# Patient Record
Sex: MALE | Race: WHITE | NOT HISPANIC OR LATINO | Employment: FULL TIME | URBAN - METROPOLITAN AREA
[De-identification: names, ages, dates, MRNs, and addresses within clinical notes are randomized per-mention and may not be internally consistent; named-entity substitution may affect disease eponyms.]

---

## 2017-01-12 ENCOUNTER — OFFICE VISIT (OUTPATIENT)
Dept: FAMILY MEDICINE CLINIC | Facility: CLINIC | Age: 57
End: 2017-01-12

## 2017-01-12 VITALS
SYSTOLIC BLOOD PRESSURE: 124 MMHG | HEART RATE: 80 BPM | BODY MASS INDEX: 35.55 KG/M2 | DIASTOLIC BLOOD PRESSURE: 82 MMHG | WEIGHT: 240 LBS | HEIGHT: 69 IN | RESPIRATION RATE: 18 BRPM | OXYGEN SATURATION: 97 %

## 2017-01-12 DIAGNOSIS — Z00.00 ANNUAL PHYSICAL EXAM: ICD-10-CM

## 2017-01-12 DIAGNOSIS — E11.9 TYPE 2 DIABETES MELLITUS WITHOUT COMPLICATION, WITHOUT LONG-TERM CURRENT USE OF INSULIN (HCC): ICD-10-CM

## 2017-01-12 DIAGNOSIS — K63.5 COLON POLYPS: ICD-10-CM

## 2017-01-12 DIAGNOSIS — N40.0 PROSTATISM: ICD-10-CM

## 2017-01-12 DIAGNOSIS — K58.0 IRRITABLE BOWEL SYNDROME WITH DIARRHEA: Primary | ICD-10-CM

## 2017-01-12 DIAGNOSIS — R73.9 HYPERGLYCEMIA: ICD-10-CM

## 2017-01-12 PROBLEM — N52.9 IMPOTENCE OF ORGANIC ORIGIN: Status: ACTIVE | Noted: 2017-01-12

## 2017-01-12 PROBLEM — G47.00 INSOMNIA: Status: ACTIVE | Noted: 2017-01-12

## 2017-01-12 PROBLEM — E55.9 VITAMIN D DEFICIENCY: Status: ACTIVE | Noted: 2017-01-12

## 2017-01-12 PROBLEM — G47.33 OBSTRUCTIVE SLEEP APNEA SYNDROME: Status: ACTIVE | Noted: 2017-01-12

## 2017-01-12 PROCEDURE — 99214 OFFICE O/P EST MOD 30 MIN: CPT | Performed by: FAMILY MEDICINE

## 2017-01-12 NOTE — PROGRESS NOTES
"Subjective   Ki Draper is a 56 y.o. male.     History of Present Illness Has \"gas problems\" under L breast. Also in the abdomen in the groin area with his BM- like a gurgle. Belching. BM's are runny to normal. Has hx colon polyps from scope 7 yr ago.  Does use his CPAP machine.  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  No exercise.    The following portions of the patient's history were reviewed and updated as appropriate: allergies, current medications, past social history and problem list.    Review of Systems   Constitutional: Negative for activity change, appetite change and unexpected weight change.   HENT: Negative for nosebleeds and trouble swallowing.    Eyes: Negative for pain and visual disturbance.   Respiratory: Negative for chest tightness, shortness of breath and wheezing.    Cardiovascular: Negative for chest pain and palpitations.   Gastrointestinal: Positive for abdominal distention, abdominal pain and diarrhea. Negative for blood in stool.        Flatus and eructation   Endocrine: Negative.    Genitourinary: Negative for difficulty urinating and hematuria.   Musculoskeletal: Negative for joint swelling.   Skin: Negative for color change and rash.   Allergic/Immunologic: Negative.    Neurological: Negative for syncope and speech difficulty.   Hematological: Negative for adenopathy.   Psychiatric/Behavioral: Negative for agitation and confusion.   All other systems reviewed and are negative.      Objective   Visit Vitals   • /82   • Pulse 80   • Resp 18   • Ht 69\" (175.3 cm)   • Wt 240 lb (109 kg)   • SpO2 97%   • BMI 35.44 kg/m2     Physical Exam   Constitutional: He is oriented to person, place, and time. Vital signs are normal. He appears well-developed and well-nourished. No distress.   HENT:   Head: Normocephalic.   Neck: Carotid bruit is not present. No thyromegaly present.   Cardiovascular: Normal rate, regular rhythm and normal heart " sounds.    Pulmonary/Chest: Effort normal and breath sounds normal.    Ki had a diabetic foot exam performed today.   During the foot exam he had a monofilament test performed.    Neurological Sensory Findings -  Unaltered sharp/dull right ankle/foot discrimination and unaltered sharp/dull left ankle/foot discrimination.    Vascular Status -  His exam exhibits right foot vasculature abnormal. His exam exhibits left foot vasculature abnormal.  Neurological: He is alert and oriented to person, place, and time. Gait normal.   Psychiatric: He has a normal mood and affect. His behavior is normal. Judgment and thought content normal.   Vitals reviewed.      Assessment/Plan   Problem List Items Addressed This Visit        Digestive    Irritable bowel syndrome with diarrhea - Primary       Endocrine    Diabetes mellitus      Other Visit Diagnoses     Colon polyps        Relevant Orders    Ambulatory Referral For Screening Colonoscopy    Prostatism        Relevant Orders    Hemoglobin A1c    PSA    Hyperglycemia        Relevant Orders    Microalbumin / Creatinine Urine Ratio    Annual physical exam        Relevant Orders    CBC & Differential    Comprehensive Metabolic Panel    Lipid Panel With / Chol / HDL Ratio    TSH    Vitamin D 25 Hydroxy           Stop cruciferous veges.  Needs an appt every 6 months for diabetes and   A1C. Pt is aware.  Start exercise program.

## 2017-01-12 NOTE — MR AVS SNAPSHOT
Ki Draper   1/12/2017 10:45 AM   Office Visit    Dept Phone:  [phone]   Encounter #:  [id number]    Provider:  Alina Barrios MD   Department:  [facility]                Your Full Care Plan              Your Updated Medication List          This list is accurate as of: 1/12/17 11:36 AM.  Always use your most recent med list.                * GLIPIZIDE XL 10 MG 24 hr tablet   Generic drug:  glipiZIDE       * GLIPIZIDE XL 10 MG 24 hr tablet   Generic drug:  glipiZIDE   TAKE ONE TABLET BY MOUTH DAILY AS DIRECTED       losartan 25 MG tablet   Commonly known as:  COZAAR   TAKE ONE-HALF TABLET BY MOUTH DAILY       metFORMIN 1000 MG tablet   Commonly known as:  GLUCOPHAGE   TAKE TWO TABLETS BY MOUTH DAILY       tadalafil 5 MG tablet   Commonly known as:  CIALIS   Take 1 tablet by mouth daily as needed for erectile dysfunction.       tamsulosin 0.4 MG capsule 24 hr capsule   Commonly known as:  FLOMAX   TAKE ONE CAPSULE BY MOUTH ONCE NIGHTLY       zolpidem 10 MG tablet   Commonly known as:  AMBIEN   TAKE ONE TABLET BY MOUTH EVERY NIGHT AT BEDTIME       * Notice:  This list has 2 medication(s) that are the same as other medications prescribed for you. Read the directions carefully, and ask your doctor or other care provider to review them with you.            We Performed the Following     Ambulatory Referral For Screening Colonoscopy     CBC & Differential     Comprehensive Metabolic Panel     Hemoglobin A1c     Lipid Panel With / Chol / HDL Ratio     Microalbumin / Creatinine Urine Ratio     PSA     TSH     Vitamin D 25 Hydroxy       You Were Diagnosed With        Codes Comments    Colon polyps    -  Primary ICD-10-CM: K63.5  ICD-9-CM: 211.3     Prostatism     ICD-10-CM: N40.0  ICD-9-CM: 600.90     Hyperglycemia     ICD-10-CM: R73.9  ICD-9-CM: 790.29     Annual physical exam     ICD-10-CM: Z00.00  ICD-9-CM: V70.0       Instructions     None    "Patient Instructions History      Upcoming Appointments     Visit Type Date Time Department    OFFICE VISIT 2017 10:45 AM [facility]      Kato Signup     [facility] Kato allows you to send messages to your doctor, view your test results, renew your prescriptions, schedule appointments, and more. To sign up, go to [employer] and click on the Sign Up Now link in the New User? box. Enter your Kato Activation Code exactly as it appears below along with the last four digits of your Social Security Number and your Date of Birth () to complete the sign-up process. If you do not sign up before the expiration date, you must request a new code.    Kato Activation Code: [email or web address]  Expires: 2017 11:36 AM    If you have questions, you can email [email or web address] or call [phone] to talk to our Kato staff. Remember, Kato is NOT to be used for urgent needs. For medical emergencies, dial 911.               Other Info from Your Visit           Allergies     Other        Reason for Visit     Heartburn     Abdominal Pain after a bowel movement    Shortness of Breath           Vital Signs     Blood Pressure Pulse Respirations Height Weight Oxygen Saturation    124/82 80 18 69\" (175.3 cm) 240 lb (109 kg) 97%    Body Mass Index                   35.44 kg/m2           Problems and Diagnoses Noted     Diabetes    Failure of erection    Difficulty falling or staying asleep    Sleep apnea    Vitamin D deficiency    Colon polyp    -  Primary    Enlarged prostate        Hyperglycemia        Annual physical exam            "

## 2017-01-13 LAB
25(OH)D3+25(OH)D2 SERPL-MCNC: 25 NG/ML (ref 30–100)
ALBUMIN SERPL-MCNC: 4.7 G/DL (ref 3.5–5.2)
ALBUMIN/CREAT UR: <4.6 MG/G CREAT (ref 0–30)
ALBUMIN/GLOB SERPL: 1.8 G/DL
ALP SERPL-CCNC: 86 U/L (ref 39–117)
ALT SERPL-CCNC: 18 U/L (ref 1–41)
AST SERPL-CCNC: 13 U/L (ref 1–40)
BASOPHILS # BLD AUTO: 0.06 10*3/MM3 (ref 0–0.2)
BASOPHILS NFR BLD AUTO: 0.8 % (ref 0–1.5)
BILIRUB SERPL-MCNC: 0.9 MG/DL (ref 0.1–1.2)
BUN SERPL-MCNC: 12 MG/DL (ref 6–20)
BUN/CREAT SERPL: 11.2 (ref 7–25)
CALCIUM SERPL-MCNC: 9.7 MG/DL (ref 8.6–10.5)
CHLORIDE SERPL-SCNC: 98 MMOL/L (ref 98–107)
CHOLEST SERPL-MCNC: 203 MG/DL (ref 0–200)
CHOLEST/HDLC SERPL: 5.97 {RATIO}
CO2 SERPL-SCNC: 25.5 MMOL/L (ref 22–29)
CREAT SERPL-MCNC: 1.07 MG/DL (ref 0.76–1.27)
CREAT UR-MCNC: 65.3 MG/DL
EOSINOPHIL # BLD AUTO: 0.19 10*3/MM3 (ref 0–0.7)
EOSINOPHIL NFR BLD AUTO: 2.5 % (ref 0.3–6.2)
ERYTHROCYTE [DISTWIDTH] IN BLOOD BY AUTOMATED COUNT: 13.3 % (ref 11.5–14.5)
GLOBULIN SER CALC-MCNC: 2.6 GM/DL
GLUCOSE SERPL-MCNC: 179 MG/DL (ref 65–99)
HBA1C MFR BLD: 8 % (ref 4.8–5.6)
HCT VFR BLD AUTO: 47.4 % (ref 40.4–52.2)
HDLC SERPL-MCNC: 34 MG/DL (ref 40–60)
HGB BLD-MCNC: 15.7 G/DL (ref 13.7–17.6)
IMM GRANULOCYTES # BLD: 0.03 10*3/MM3 (ref 0–0.03)
IMM GRANULOCYTES NFR BLD: 0.4 % (ref 0–0.5)
LDLC SERPL CALC-MCNC: 101 MG/DL (ref 0–100)
LYMPHOCYTES # BLD AUTO: 1.87 10*3/MM3 (ref 0.9–4.8)
LYMPHOCYTES NFR BLD AUTO: 24.6 % (ref 19.6–45.3)
MCH RBC QN AUTO: 30.7 PG (ref 27–32.7)
MCHC RBC AUTO-ENTMCNC: 33.1 G/DL (ref 32.6–36.4)
MCV RBC AUTO: 92.6 FL (ref 79.8–96.2)
MICROALBUMIN UR-MCNC: <3 UG/ML
MONOCYTES # BLD AUTO: 0.63 10*3/MM3 (ref 0.2–1.2)
MONOCYTES NFR BLD AUTO: 8.3 % (ref 5–12)
NEUTROPHILS # BLD AUTO: 4.81 10*3/MM3 (ref 1.9–8.1)
NEUTROPHILS NFR BLD AUTO: 63.4 % (ref 42.7–76)
PLATELET # BLD AUTO: 305 10*3/MM3 (ref 140–500)
POTASSIUM SERPL-SCNC: 4.7 MMOL/L (ref 3.5–5.2)
PROT SERPL-MCNC: 7.3 G/DL (ref 6–8.5)
PSA SERPL-MCNC: 1.73 NG/ML (ref 0–4)
RBC # BLD AUTO: 5.12 10*6/MM3 (ref 4.6–6)
SODIUM SERPL-SCNC: 140 MMOL/L (ref 136–145)
TRIGL SERPL-MCNC: 342 MG/DL (ref 0–150)
TSH SERPL DL<=0.005 MIU/L-ACNC: 1.16 MIU/ML (ref 0.27–4.2)
VLDLC SERPL CALC-MCNC: 68.4 MG/DL (ref 5–40)
WBC # BLD AUTO: 7.59 10*3/MM3 (ref 4.5–10.7)

## 2017-01-24 DIAGNOSIS — E78.1 HYPERTRIGLYCERIDEMIA: Primary | ICD-10-CM

## 2017-01-24 RX ORDER — ICOSAPENT ETHYL 1000 MG/1
2 CAPSULE ORAL 2 TIMES DAILY WITH MEALS
Qty: 120 CAPSULE | Refills: 0 | COMMUNITY
Start: 2017-01-24 | End: 2018-03-08

## 2017-03-01 ENCOUNTER — LAB (OUTPATIENT)
Dept: FAMILY MEDICINE CLINIC | Facility: CLINIC | Age: 57
End: 2017-03-01

## 2017-03-01 DIAGNOSIS — E78.49 OTHER HYPERLIPIDEMIA: Primary | ICD-10-CM

## 2017-03-01 DIAGNOSIS — Z79.899 HIGH RISK MEDICATION USE: ICD-10-CM

## 2017-03-01 LAB
ALBUMIN SERPL-MCNC: 4.3 G/DL (ref 3.5–5.2)
ALBUMIN/GLOB SERPL: 1.8 G/DL
ALP SERPL-CCNC: 73 U/L (ref 39–117)
ALT SERPL-CCNC: 16 U/L (ref 1–41)
AST SERPL-CCNC: 10 U/L (ref 1–40)
BILIRUB SERPL-MCNC: 1.3 MG/DL (ref 0.1–1.2)
BUN SERPL-MCNC: 8 MG/DL (ref 6–20)
BUN/CREAT SERPL: 7.9 (ref 7–25)
CALCIUM SERPL-MCNC: 9.7 MG/DL (ref 8.6–10.5)
CHLORIDE SERPL-SCNC: 99 MMOL/L (ref 98–107)
CHOLEST SERPL-MCNC: 210 MG/DL (ref 0–200)
CO2 SERPL-SCNC: 27.8 MMOL/L (ref 22–29)
CREAT SERPL-MCNC: 1.01 MG/DL (ref 0.76–1.27)
GLOBULIN SER CALC-MCNC: 2.4 GM/DL
GLUCOSE SERPL-MCNC: 218 MG/DL (ref 65–99)
HDLC SERPL-MCNC: 35 MG/DL (ref 40–60)
LDLC SERPL CALC-MCNC: 120 MG/DL (ref 0–100)
LDLC/HDLC SERPL: 3.42 {RATIO}
POTASSIUM SERPL-SCNC: 4.8 MMOL/L (ref 3.5–5.2)
PROT SERPL-MCNC: 6.7 G/DL (ref 6–8.5)
SODIUM SERPL-SCNC: 140 MMOL/L (ref 136–145)
TRIGL SERPL-MCNC: 276 MG/DL (ref 0–150)
VLDLC SERPL CALC-MCNC: 55.2 MG/DL (ref 5–40)

## 2017-03-07 RX ORDER — LOSARTAN POTASSIUM 25 MG/1
TABLET ORAL
Qty: 45 TABLET | Refills: 3 | Status: SHIPPED | OUTPATIENT
Start: 2017-03-07 | End: 2018-03-08 | Stop reason: SDUPTHER

## 2017-03-13 ENCOUNTER — TELEPHONE (OUTPATIENT)
Dept: FAMILY MEDICINE CLINIC | Facility: CLINIC | Age: 57
End: 2017-03-13

## 2017-03-13 NOTE — TELEPHONE ENCOUNTER
----- Message from Alina Barrios MD sent at 3/2/2017  9:24 AM EST -----  Appt to discuss sugar average of 183 and cholesterol numbers, which are worse on the repeat test. Let me know if he doesnot make this appt.

## 2017-03-15 DIAGNOSIS — E11.65 TYPE 2 DIABETES MELLITUS WITH HYPERGLYCEMIA, WITHOUT LONG-TERM CURRENT USE OF INSULIN (HCC): Primary | ICD-10-CM

## 2017-03-16 DIAGNOSIS — E78.5 HYPERLIPIDEMIA, UNSPECIFIED HYPERLIPIDEMIA TYPE: Primary | ICD-10-CM

## 2017-03-16 RX ORDER — ATORVASTATIN CALCIUM 10 MG/1
10 TABLET, FILM COATED ORAL DAILY
Qty: 30 TABLET | Refills: 1 | Status: SHIPPED | OUTPATIENT
Start: 2017-03-16 | End: 2018-03-08

## 2017-03-28 ENCOUNTER — OFFICE VISIT (OUTPATIENT)
Dept: GASTROENTEROLOGY | Facility: CLINIC | Age: 57
End: 2017-03-28

## 2017-03-28 VITALS
HEIGHT: 69 IN | BODY MASS INDEX: 35.4 KG/M2 | DIASTOLIC BLOOD PRESSURE: 74 MMHG | SYSTOLIC BLOOD PRESSURE: 122 MMHG | WEIGHT: 239 LBS

## 2017-03-28 DIAGNOSIS — K58.0 IRRITABLE BOWEL SYNDROME WITH DIARRHEA: ICD-10-CM

## 2017-03-28 DIAGNOSIS — R10.30 LOWER ABDOMINAL PAIN: Primary | ICD-10-CM

## 2017-03-28 PROCEDURE — 99203 OFFICE O/P NEW LOW 30 MIN: CPT | Performed by: INTERNAL MEDICINE

## 2017-03-28 RX ORDER — LANSOPRAZOLE 15 MG/1
15 CAPSULE, DELAYED RELEASE ORAL DAILY
COMMUNITY
End: 2019-05-24

## 2017-03-28 RX ORDER — CHLORAL HYDRATE 500 MG
CAPSULE ORAL
COMMUNITY

## 2017-03-28 RX ORDER — FAMOTIDINE 10 MG
10 TABLET ORAL 2 TIMES DAILY
COMMUNITY
End: 2019-08-26

## 2017-03-28 NOTE — PROGRESS NOTES
"Chief Complaint   Patient presents with   • Abdominal Pain        Ki Draper is a  56 y.o. male here for an initial visit for abdominal pain, IBS    HPI this 56-year-old patient of Dr. Alina Barrios presents with a 6 week history of \"tightness and trapped gas\" affecting the left upper quadrant underneath the left breast.  There was also some associated \"gurgling and bloating gas\" it caused him to be short of breath.  He treated this with Gas-X with not complete resolution.  The pain that was associated with this has resolved at this time and he does admit to use of Pepcid at bedtime and Prevacid over-the-counter in the morning.  He also had been taking probiotics.  The gurgling seem to either be followed by belching or passing gas from below.  His bowel pattern usually ranges from 2-3 stools a day to every second day and are formed in nature however since he started Vascepa he has noted increasing bowel frequency to 2 stools a day usually loose in consistency.  He has some left lower quadrant discomfort of a pinching nature that seems to be associated before defecation and is alleviated by bowel movements.  He has lost approximately 24 pounds over the past 8 months by choice.  He admits to adjusting his diet with more vegetable intake and that may be a contributing factor to his gas production.  He denies any melena or bright red blood per rectum.  He had undergone both upper and lower endoscopic evaluations back in January 2010 : upper endoscopy revealed some bile reflux and mild esophagitis, colonoscopy showed 2 5-7 mm hyperplastic polyps in the sigmoid colon and serial biopsies obtained at that time were all normal.  We discussed options in terms of evaluation to include CT scan of the abdomen and pelvis versus endoscopy versus observation.  He prefers to observe for the next 2 weeks and will call with a progress report.    Past Medical History:   Diagnosis Date   • Diabetes mellitus    • Hyperlipidemia  "       Current Outpatient Prescriptions   Medication Sig Dispense Refill   • Cyanocobalamin (VITAMIN B 12 PO) Take  by mouth.     • famotidine (PEPCID) 10 MG tablet Take 10 mg by mouth 2 (Two) Times a Day.     • GLIPIZIDE XL 10 MG 24 hr tablet TAKE ONE TABLET BY MOUTH DAILY AS DIRECTED 90 tablet 0   • icosapent ethyl (VASCEPA) 1 G capsule capsule Take 2 g by mouth 2 (Two) Times a Day With Meals. 120 capsule 0   • lansoprazole (PREVACID) 15 MG capsule Take 15 mg by mouth Daily.     • losartan (COZAAR) 25 MG tablet TAKE ONE-HALF TABLET BY MOUTH DAILY 45 tablet 3   • metFORMIN (GLUCOPHAGE) 1000 MG tablet TAKE TWO TABLETS BY MOUTH DAILY 180 tablet 3   • Multiple Vitamin (MULTI VITAMIN PO) Take  by mouth.     • Omega-3 Fatty Acids (FISH OIL) 1000 MG capsule capsule Take  by mouth Daily With Breakfast.     • tadalafil (CIALIS) 5 MG tablet Take 1 tablet by mouth daily as needed for erectile dysfunction. 30 tablet 3   • tamsulosin (FLOMAX) 0.4 MG capsule 24 hr capsule TAKE ONE CAPSULE BY MOUTH ONCE NIGHTLY 90 capsule 0   • zolpidem (AMBIEN) 10 MG tablet TAKE ONE TABLET BY MOUTH EVERY NIGHT AT BEDTIME 30 tablet 1   • atorvastatin (LIPITOR) 10 MG tablet Take 1 tablet by mouth Daily. 30 tablet 1     No current facility-administered medications for this visit.        PRN Meds:.    Allergies   Allergen Reactions   • Other        Social History     Social History   • Marital status:      Spouse name: N/A   • Number of children: N/A   • Years of education: N/A     Occupational History   • Not on file.     Social History Main Topics   • Smoking status: Never Smoker   • Smokeless tobacco: Never Used   • Alcohol use Yes   • Drug use: No   • Sexual activity: Not on file     Other Topics Concern   • Not on file     Social History Narrative   • No narrative on file       History reviewed. No pertinent family history.    Review of Systems   Constitutional: Negative for activity change, appetite change, fatigue and unexpected weight  change.   HENT: Negative for congestion, facial swelling, sore throat, trouble swallowing and voice change.    Eyes: Negative for photophobia and visual disturbance.   Respiratory: Negative for cough and choking.    Cardiovascular: Negative for chest pain.   Gastrointestinal: Positive for abdominal pain and diarrhea. Negative for abdominal distention, anal bleeding, blood in stool, constipation, nausea, rectal pain and vomiting.        Excess gas   Endocrine: Negative for polyphagia.   Musculoskeletal: Negative for arthralgias, gait problem and joint swelling.   Skin: Negative for color change, pallor and rash.   Allergic/Immunologic: Negative for food allergies.   Neurological: Negative for speech difficulty and headaches.   Hematological: Does not bruise/bleed easily.   Psychiatric/Behavioral: Negative for agitation, confusion and sleep disturbance.       Vitals:    03/28/17 0800   BP: 122/74       Physical Exam   Constitutional: He is oriented to person, place, and time. He appears well-developed and well-nourished. No distress.   HENT:   Head: Normocephalic.   Mouth/Throat: Oropharynx is clear and moist. No oropharyngeal exudate.   Eyes: Conjunctivae and EOM are normal. No scleral icterus.   Neck: Normal range of motion. No thyromegaly present.   Cardiovascular: Normal rate and regular rhythm.    No murmur heard.  Pulmonary/Chest: Breath sounds normal. No respiratory distress. He has no wheezes. He has no rales.   Abdominal: Soft. Bowel sounds are normal. He exhibits no distension and no mass. There is no hepatosplenomegaly. There is tenderness.   Right upper quadrant, epigastric, left upper quadrant tenderness   Musculoskeletal: Normal range of motion. He exhibits no edema or tenderness.   Lymphadenopathy:     He has no cervical adenopathy.   Neurological: He is alert and oriented to person, place, and time.   Skin: Skin is warm and dry. No rash noted. He is not diaphoretic. No erythema.   Psychiatric: He has a  normal mood and affect. His behavior is normal.   Vitals reviewed.      ASSESSMENT   #1 abdominal distention/pain: May be associated with increase in gas production, cannot rule out the possibility of gastric or colonic inflammatory changes.  Improvement with the PPI suggest acid related process.  #2 change in bowel pattern: May be medication influence, may also be associated with dietary changes  #3 IBS  #4 history of hyperplastic polyps      PLAN  Patient to observe condition with continuing current medication regimen and will call with a progress report in 2 weeks.  If symptoms persist will offer CT of the abdomen and pelvis  If scan is negative or inconclusive will consider endoscopic evaluation.  Would warrant follow-up colonoscopy in 2020 for routine health maintenance      ICD-10-CM ICD-9-CM   1. Lower abdominal pain R10.30 789.09   2. Irritable bowel syndrome with diarrhea K58.0 564.1

## 2017-03-29 RX ORDER — GLIPIZIDE 10 MG/1
TABLET, FILM COATED, EXTENDED RELEASE ORAL
Qty: 90 TABLET | Refills: 4 | Status: SHIPPED | OUTPATIENT
Start: 2017-03-29 | End: 2018-03-08 | Stop reason: ALTCHOICE

## 2017-04-06 RX ORDER — ZOLPIDEM TARTRATE 10 MG/1
TABLET ORAL
Qty: 30 TABLET | Refills: 1 | Status: SHIPPED | OUTPATIENT
Start: 2017-04-06 | End: 2017-07-01 | Stop reason: SDUPTHER

## 2017-05-11 DIAGNOSIS — E16.2 HYPOGLYCEMIA: Primary | ICD-10-CM

## 2017-05-11 DIAGNOSIS — E78.49 OTHER HYPERLIPIDEMIA: ICD-10-CM

## 2017-05-11 LAB — HBA1C MFR BLD: 7.7 % (ref 4.8–5.6)

## 2017-07-03 RX ORDER — ZOLPIDEM TARTRATE 10 MG/1
TABLET ORAL
Qty: 30 TABLET | Refills: 1 | Status: SHIPPED | OUTPATIENT
Start: 2017-07-03 | End: 2018-03-08 | Stop reason: SDUPTHER

## 2017-08-08 ENCOUNTER — TELEPHONE (OUTPATIENT)
Dept: FAMILY MEDICINE CLINIC | Facility: CLINIC | Age: 57
End: 2017-08-08

## 2017-08-08 NOTE — TELEPHONE ENCOUNTER
He needs to ask his pharmacist if they will take a coupon from [facility] as that is the cheapest. He can print that off from his computer.  He can have sildenafil 25 mg, 2 or 3 prn once a day.  #10 rf5

## 2017-08-10 RX ORDER — SILDENAFIL 25 MG/1
TABLET, FILM COATED ORAL
Qty: 10 TABLET | Refills: 5 | Status: SHIPPED | OUTPATIENT
Start: 2017-08-10 | End: 2018-03-08

## 2017-08-14 RX ORDER — SILDENAFIL CITRATE 20 MG/1
TABLET ORAL
Qty: 10 TABLET | Refills: 5 | Status: SHIPPED | OUTPATIENT
Start: 2017-08-14 | End: 2017-12-04 | Stop reason: SDUPTHER

## 2017-12-04 RX ORDER — SILDENAFIL CITRATE 20 MG/1
TABLET ORAL
Qty: 30 TABLET | Refills: 5 | Status: SHIPPED | OUTPATIENT
Start: 2017-12-04 | End: 2018-03-08 | Stop reason: SDUPTHER

## 2018-03-08 ENCOUNTER — OFFICE VISIT (OUTPATIENT)
Dept: FAMILY MEDICINE CLINIC | Facility: CLINIC | Age: 58
End: 2018-03-08

## 2018-03-08 VITALS
SYSTOLIC BLOOD PRESSURE: 126 MMHG | BODY MASS INDEX: 35.9 KG/M2 | WEIGHT: 242.4 LBS | OXYGEN SATURATION: 98 % | HEART RATE: 72 BPM | HEIGHT: 69 IN | DIASTOLIC BLOOD PRESSURE: 80 MMHG | TEMPERATURE: 98.3 F

## 2018-03-08 DIAGNOSIS — G47.00 INSOMNIA, UNSPECIFIED TYPE: ICD-10-CM

## 2018-03-08 DIAGNOSIS — R35.0 BENIGN PROSTATIC HYPERPLASIA WITH URINARY FREQUENCY: ICD-10-CM

## 2018-03-08 DIAGNOSIS — I10 ESSENTIAL HYPERTENSION: ICD-10-CM

## 2018-03-08 DIAGNOSIS — E11.9 TYPE 2 DIABETES MELLITUS WITHOUT COMPLICATION, WITHOUT LONG-TERM CURRENT USE OF INSULIN (HCC): Primary | ICD-10-CM

## 2018-03-08 DIAGNOSIS — Z12.5 SPECIAL SCREENING FOR MALIGNANT NEOPLASM OF PROSTATE: ICD-10-CM

## 2018-03-08 DIAGNOSIS — N40.1 BENIGN PROSTATIC HYPERPLASIA WITH URINARY FREQUENCY: ICD-10-CM

## 2018-03-08 DIAGNOSIS — E78.5 HYPERLIPIDEMIA, UNSPECIFIED HYPERLIPIDEMIA TYPE: ICD-10-CM

## 2018-03-08 DIAGNOSIS — N52.9 IMPOTENCE OF ORGANIC ORIGIN: ICD-10-CM

## 2018-03-08 PROBLEM — M16.12 PRIMARY OSTEOARTHRITIS OF LEFT HIP: Status: ACTIVE | Noted: 2018-01-11

## 2018-03-08 PROBLEM — E55.9 VITAMIN D DEFICIENCY: Status: RESOLVED | Noted: 2017-01-12 | Resolved: 2018-03-08

## 2018-03-08 PROBLEM — K21.9 GASTROESOPHAGEAL REFLUX DISEASE WITHOUT ESOPHAGITIS: Status: ACTIVE | Noted: 2018-03-08

## 2018-03-08 LAB
CHOLEST SERPL-MCNC: 247 MG/DL (ref 0–200)
HDLC SERPL-MCNC: 38 MG/DL (ref 40–60)
LDLC SERPL CALC-MCNC: 147 MG/DL (ref 0–100)
LDLC/HDLC SERPL: 3.87 {RATIO}
PSA SERPL-MCNC: 1.91 NG/ML (ref 0–4)
TRIGL SERPL-MCNC: 309 MG/DL (ref 0–150)
VLDLC SERPL CALC-MCNC: 61.8 MG/DL (ref 5–40)

## 2018-03-08 PROCEDURE — 99214 OFFICE O/P EST MOD 30 MIN: CPT | Performed by: NURSE PRACTITIONER

## 2018-03-08 RX ORDER — SILDENAFIL CITRATE 20 MG/1
TABLET ORAL
Qty: 30 TABLET | Refills: 5 | Status: SHIPPED | OUTPATIENT
Start: 2018-03-08 | End: 2019-04-01 | Stop reason: SDUPTHER

## 2018-03-08 RX ORDER — ZOLPIDEM TARTRATE 10 MG/1
10 TABLET ORAL NIGHTLY
Qty: 90 TABLET | Refills: 0 | Status: SHIPPED | OUTPATIENT
Start: 2018-03-08 | End: 2018-06-05 | Stop reason: SDUPTHER

## 2018-03-08 RX ORDER — LOSARTAN POTASSIUM 25 MG/1
TABLET ORAL
Qty: 135 TABLET | Refills: 3 | Status: SHIPPED | OUTPATIENT
Start: 2018-03-08 | End: 2019-02-26

## 2018-03-08 RX ORDER — TAMSULOSIN HYDROCHLORIDE 0.4 MG/1
1 CAPSULE ORAL DAILY
Qty: 90 CAPSULE | Refills: 3 | Status: SHIPPED | OUTPATIENT
Start: 2018-03-08 | End: 2020-03-03

## 2018-03-08 NOTE — PROGRESS NOTES
"Subjective   Ki Draper is a 57 y.o. male.     History of Present Illness   Ki Draper 57 y.o. male who presents today for routine follow up check and medication refills.  he has a history of   Patient Active Problem List   Diagnosis   • Diabetes mellitus   • Insomnia   • Impotence of organic origin   • Obstructive sleep apnea syndrome   • Irritable bowel syndrome with diarrhea   • Primary osteoarthritis of left hip   • Benign prostatic hyperplasia with urinary frequency   • Essential hypertension   • Gastroesophageal reflux disease without esophagitis   • Hyperlipidemia   • Special screening for malignant neoplasm of prostate   .  Since the last visit, he has overall felt well.  He has Hypertenision and is well controlled on medication, DMII and is here to discuss recent abnormal labs, GERD and is well controlled on PPI medication and BPH is well controlled, insomnia is well controlled, .  he has been compliant with current medications have reviewed them.  The patient denies medication side effects.    Results for orders placed or performed in visit on 03/15/17   Hemoglobin A1c   Result Value Ref Range    Hemoglobin A1C 7.70 (H) 4.80 - 5.60 %         The following portions of the patient's history were reviewed and updated as appropriate: allergies, current medications, past social history and problem list.    Review of Systems   Constitutional: Negative for fever.   All other systems reviewed and are negative.      Objective   /80 (BP Location: Right arm, Patient Position: Sitting)  Pulse 72  Temp 98.3 °F (36.8 °C)  Ht 175.3 cm (69.02\")  Wt 110 kg (242 lb 6.4 oz)  SpO2 98%  BMI 35.78 kg/m2  Physical Exam   Constitutional: He is oriented to person, place, and time. Vital signs are normal. He appears well-developed and well-nourished. No distress.   HENT:   Head: Normocephalic.   Cardiovascular: Normal rate, regular rhythm and normal heart sounds.    Pulmonary/Chest: Effort normal and breath sounds " normal.   Neurological: He is alert and oriented to person, place, and time. Gait normal.   Psychiatric: He has a normal mood and affect. His behavior is normal. Judgment and thought content normal.   Vitals reviewed.      Assessment/Plan   Problem List Items Addressed This Visit        Cardiovascular and Mediastinum    Essential hypertension    Relevant Medications    losartan (COZAAR) 25 MG tablet    Hyperlipidemia    Relevant Orders    Lipid Panel With LDL / HDL Ratio       Endocrine    Diabetes mellitus - Primary    Relevant Medications    metFORMIN (GLUCOPHAGE) 1000 MG tablet    SITagliptin (JANUVIA) 100 MG tablet       Genitourinary    Impotence of organic origin    Relevant Medications    sildenafil (REVATIO) 20 MG tablet    Benign prostatic hyperplasia with urinary frequency    Relevant Medications    tamsulosin (FLOMAX) 0.4 MG capsule 24 hr capsule       Other    Insomnia    Relevant Medications    zolpidem (AMBIEN) 10 MG tablet    Special screening for malignant neoplasm of prostate    Relevant Orders    PSA Screen        Follow up after labs   rto in 3 mons   Stop Glipizide and start Januvia

## 2018-03-09 RX ORDER — EZETIMIBE 10 MG/1
10 TABLET ORAL DAILY
Qty: 90 TABLET | Refills: 3 | Status: SHIPPED | OUTPATIENT
Start: 2018-03-09 | End: 2018-06-05

## 2018-03-14 ENCOUNTER — TELEPHONE (OUTPATIENT)
Dept: FAMILY MEDICINE CLINIC | Facility: CLINIC | Age: 58
End: 2018-03-14

## 2018-03-14 NOTE — TELEPHONE ENCOUNTER
Pt's blood sugar readings have been in the high 200s/300s for at least a week. Pt recently started taking Januvia 100 mg. Pt would like a phone call back if and when possible to discuss this med? Or should pt come in for an appointment?

## 2018-03-14 NOTE — TELEPHONE ENCOUNTER
The med will take a little bit of an adjustment but your sugars should start to come down next week.  If not make appointment

## 2018-04-23 ENCOUNTER — TELEPHONE (OUTPATIENT)
Dept: FAMILY MEDICINE CLINIC | Facility: CLINIC | Age: 58
End: 2018-04-23

## 2018-04-23 NOTE — TELEPHONE ENCOUNTER
The sugar can go up before it goes down with januvia.  If he is still concerned then get him in to get a1c

## 2018-04-23 NOTE — TELEPHONE ENCOUNTER
"Pt states that he has taken the Januvia as prescribed and \"it's just not working for him.\" pt's sugar levels are in the 230's. Pt is starting to get numbness in his fingers and toes. Could pt get a phone call back before the day is over if possible?   "

## 2018-06-05 ENCOUNTER — OFFICE VISIT (OUTPATIENT)
Dept: FAMILY MEDICINE CLINIC | Facility: CLINIC | Age: 58
End: 2018-06-05

## 2018-06-05 VITALS
BODY MASS INDEX: 35.55 KG/M2 | OXYGEN SATURATION: 98 % | WEIGHT: 240 LBS | DIASTOLIC BLOOD PRESSURE: 82 MMHG | HEIGHT: 69 IN | SYSTOLIC BLOOD PRESSURE: 134 MMHG | HEART RATE: 83 BPM | TEMPERATURE: 98.8 F

## 2018-06-05 DIAGNOSIS — G47.00 INSOMNIA, UNSPECIFIED TYPE: ICD-10-CM

## 2018-06-05 DIAGNOSIS — I10 ESSENTIAL HYPERTENSION: ICD-10-CM

## 2018-06-05 DIAGNOSIS — E11.9 TYPE 2 DIABETES MELLITUS WITHOUT COMPLICATION, WITHOUT LONG-TERM CURRENT USE OF INSULIN (HCC): Primary | ICD-10-CM

## 2018-06-05 DIAGNOSIS — K21.9 GASTROESOPHAGEAL REFLUX DISEASE WITHOUT ESOPHAGITIS: ICD-10-CM

## 2018-06-05 DIAGNOSIS — E78.5 HYPERLIPIDEMIA, UNSPECIFIED HYPERLIPIDEMIA TYPE: ICD-10-CM

## 2018-06-05 PROBLEM — Z12.5 SPECIAL SCREENING FOR MALIGNANT NEOPLASM OF PROSTATE: Status: RESOLVED | Noted: 2018-03-08 | Resolved: 2018-06-05

## 2018-06-05 LAB — HBA1C MFR BLD: 8.9 %

## 2018-06-05 PROCEDURE — 99214 OFFICE O/P EST MOD 30 MIN: CPT | Performed by: NURSE PRACTITIONER

## 2018-06-05 PROCEDURE — 83036 HEMOGLOBIN GLYCOSYLATED A1C: CPT | Performed by: NURSE PRACTITIONER

## 2018-06-05 RX ORDER — ZOLPIDEM TARTRATE 10 MG/1
10 TABLET ORAL NIGHTLY
Qty: 90 TABLET | Refills: 0 | Status: SHIPPED | OUTPATIENT
Start: 2018-06-05 | End: 2018-08-31 | Stop reason: SDUPTHER

## 2018-06-05 NOTE — PROGRESS NOTES
"Subjective   Ki Draper is a 57 y.o. male.     History of Present Illness   Ki Draper 57 y.o. male who presents today for routine follow up check and medication refills.  he has a history of   Patient Active Problem List   Diagnosis   • Diabetes mellitus   • Insomnia   • Impotence of organic origin   • Obstructive sleep apnea syndrome   • Irritable bowel syndrome with diarrhea   • Primary osteoarthritis of left hip   • Benign prostatic hyperplasia with urinary frequency   • Essential hypertension   • Gastroesophageal reflux disease without esophagitis   • Hyperlipidemia   .  Since the last visit, he has overall felt well.  He has Hypertenision and is well controlled on medication, GERD and is well controlled on PPI medication and insomnia is well controlled, not taking Zetia due to rash.  he has been compliant with current medications have reviewed them.  The patient denies medication side effects.    Diabetes hasn't improved.  He is taking meds as directed but not exercising or watching food intake.  He recently had a hip replacement and is cleared for activity.     Results for orders placed or performed in visit on 06/05/18   POC Glycosylated Hemoglobin (Hb A1C)   Result Value Ref Range    Hemoglobin A1C 8.9 %         The following portions of the patient's history were reviewed and updated as appropriate: allergies, current medications, past social history and problem list.    Review of Systems   Constitutional: Negative for fever.   All other systems reviewed and are negative.      Objective   /82 (BP Location: Right arm, Patient Position: Sitting)   Pulse 83   Temp 98.8 °F (37.1 °C)   Ht 175.3 cm (69.02\")   Wt 109 kg (240 lb)   SpO2 98%   BMI 35.43 kg/m²   Physical Exam   Constitutional: He is oriented to person, place, and time. Vital signs are normal. He appears well-developed and well-nourished. No distress.   HENT:   Head: Normocephalic.   Cardiovascular: Normal rate, regular rhythm and " normal heart sounds.    Pulmonary/Chest: Effort normal and breath sounds normal.   Neurological: He is alert and oriented to person, place, and time. Gait normal.   Psychiatric: He has a normal mood and affect. His behavior is normal. Judgment and thought content normal.   Vitals reviewed.    The patient has read and signed the [facility] Controlled Substance Contract.  I will continue to see patient for regular follow up appointments.  They are well controlled on their medication.  LINDSAY has been reviewed by me and is updated every 3 months. The patient is aware of the potential for addiction and dependence.    Assessment/Plan   Problem List Items Addressed This Visit        Cardiovascular and Mediastinum    Essential hypertension    Hyperlipidemia       Digestive    Gastroesophageal reflux disease without esophagitis       Endocrine    Diabetes mellitus - Primary    Relevant Medications    Dapagliflozin Propanediol (FARXIGA) 10 MG tablet    Other Relevant Orders    POC Glycosylated Hemoglobin (Hb A1C) (Completed)       Other    Insomnia    Relevant Medications    zolpidem (AMBIEN) 10 MG tablet        rto in 3 mons    mustwork on diet and weight loss  Walk 1-2 miles daily and focus on lean meats and veggies

## 2018-08-30 DIAGNOSIS — G47.00 INSOMNIA, UNSPECIFIED TYPE: ICD-10-CM

## 2018-08-30 RX ORDER — ZOLPIDEM TARTRATE 10 MG/1
TABLET ORAL
Qty: 90 TABLET | Refills: 0 | OUTPATIENT
Start: 2018-08-30

## 2018-08-31 ENCOUNTER — OFFICE VISIT (OUTPATIENT)
Dept: FAMILY MEDICINE CLINIC | Facility: CLINIC | Age: 58
End: 2018-08-31

## 2018-08-31 VITALS
TEMPERATURE: 98.9 F | SYSTOLIC BLOOD PRESSURE: 138 MMHG | HEIGHT: 69 IN | HEART RATE: 72 BPM | OXYGEN SATURATION: 100 % | WEIGHT: 231.8 LBS | BODY MASS INDEX: 34.33 KG/M2 | DIASTOLIC BLOOD PRESSURE: 78 MMHG

## 2018-08-31 DIAGNOSIS — G47.00 INSOMNIA, UNSPECIFIED TYPE: ICD-10-CM

## 2018-08-31 DIAGNOSIS — E11.9 TYPE 2 DIABETES MELLITUS WITHOUT COMPLICATION, WITHOUT LONG-TERM CURRENT USE OF INSULIN (HCC): Primary | ICD-10-CM

## 2018-08-31 DIAGNOSIS — I10 ESSENTIAL HYPERTENSION: ICD-10-CM

## 2018-08-31 DIAGNOSIS — E78.5 HYPERLIPIDEMIA, UNSPECIFIED HYPERLIPIDEMIA TYPE: ICD-10-CM

## 2018-08-31 DIAGNOSIS — K21.9 GASTROESOPHAGEAL REFLUX DISEASE WITHOUT ESOPHAGITIS: ICD-10-CM

## 2018-08-31 PROCEDURE — 99214 OFFICE O/P EST MOD 30 MIN: CPT | Performed by: NURSE PRACTITIONER

## 2018-08-31 PROCEDURE — 83036 HEMOGLOBIN GLYCOSYLATED A1C: CPT | Performed by: NURSE PRACTITIONER

## 2018-08-31 RX ORDER — ZOLPIDEM TARTRATE 10 MG/1
10 TABLET ORAL NIGHTLY
Qty: 90 TABLET | Refills: 0 | Status: SHIPPED | OUTPATIENT
Start: 2018-08-31 | End: 2018-11-28 | Stop reason: SDUPTHER

## 2018-10-23 ENCOUNTER — OFFICE VISIT (OUTPATIENT)
Dept: FAMILY MEDICINE CLINIC | Facility: CLINIC | Age: 58
End: 2018-10-23

## 2018-10-23 VITALS
TEMPERATURE: 98.3 F | OXYGEN SATURATION: 98 % | HEART RATE: 76 BPM | BODY MASS INDEX: 34.36 KG/M2 | DIASTOLIC BLOOD PRESSURE: 76 MMHG | SYSTOLIC BLOOD PRESSURE: 124 MMHG | HEIGHT: 69 IN | WEIGHT: 232 LBS

## 2018-10-23 DIAGNOSIS — Z01.818 PRE-OP EXAMINATION: ICD-10-CM

## 2018-10-23 DIAGNOSIS — G56.00 CARPAL TUNNEL SYNDROME, UNSPECIFIED LATERALITY: Primary | ICD-10-CM

## 2018-10-23 PROCEDURE — 99213 OFFICE O/P EST LOW 20 MIN: CPT | Performed by: NURSE PRACTITIONER

## 2018-10-23 PROCEDURE — 93000 ELECTROCARDIOGRAM COMPLETE: CPT | Performed by: NURSE PRACTITIONER

## 2018-10-23 NOTE — PROGRESS NOTES
"Subjective   Ki Draper is a 58 y.o. male.     History of Present Illness   Pt presenting to the office today for surgery clearance.  Carpal tunnel correction planned on 10/29/18 by surgeon Dr. Connors.  Recent weight has been stable. Patient experiencing fear or anxiety regarding surgery: no concerns .  Significant medical history: hypertension and diabetes.    The following portions of the patient's history were reviewed and updated as appropriate: allergies, current medications, past social history and problem list.    Review of Systems   All other systems reviewed and are negative.      Objective   /76 (BP Location: Right arm, Patient Position: Sitting)   Pulse 76   Temp 98.3 °F (36.8 °C)   Ht 175.3 cm (69.02\")   Wt 105 kg (232 lb)   SpO2 98%   BMI 34.24 kg/m²   Physical Exam   Constitutional: He is oriented to person, place, and time. Vital signs are normal. He appears well-developed and well-nourished. No distress.   HENT:   Head: Normocephalic.   Cardiovascular: Normal rate, regular rhythm and normal heart sounds.    Pulmonary/Chest: Effort normal and breath sounds normal.   Neurological: He is alert and oriented to person, place, and time. Gait normal.   Psychiatric: He has a normal mood and affect. His behavior is normal. Judgment and thought content normal.   Vitals reviewed.      ECG 12 Lead  Date/Time: 10/23/2018 3:47 PM  Performed by: NEREYDA COLLINS  Authorized by: NEREYDA COLLINS   Comparison: not compared with previous ECG   Previous ECG: no previous ECG available  Rhythm: sinus rhythm  Rate: normal  Conduction: conduction normal  ST Segments: ST segments normal  T Waves: T waves normal  QRS axis: normal  Other: no other findings  Clinical impression: normal ECG            Assessment/Plan      Diagnosis Plan   1. Carpal tunnel syndrome, unspecified laterality     2. Pre-op examination  ECG 12 Lead       Cleared for surgery without seeing labs.  His labs are being drawn at different " office prior to surgery  Garret Sexton, APRN  10/23/2018

## 2018-11-28 ENCOUNTER — OFFICE VISIT (OUTPATIENT)
Dept: FAMILY MEDICINE CLINIC | Facility: CLINIC | Age: 58
End: 2018-11-28

## 2018-11-28 VITALS
BODY MASS INDEX: 33.92 KG/M2 | SYSTOLIC BLOOD PRESSURE: 122 MMHG | HEIGHT: 69 IN | TEMPERATURE: 98.5 F | HEART RATE: 80 BPM | DIASTOLIC BLOOD PRESSURE: 74 MMHG | WEIGHT: 229 LBS | OXYGEN SATURATION: 99 %

## 2018-11-28 DIAGNOSIS — G47.00 INSOMNIA, UNSPECIFIED TYPE: ICD-10-CM

## 2018-11-28 DIAGNOSIS — E78.5 HYPERLIPIDEMIA, UNSPECIFIED HYPERLIPIDEMIA TYPE: ICD-10-CM

## 2018-11-28 DIAGNOSIS — E11.9 TYPE 2 DIABETES MELLITUS WITHOUT COMPLICATION, WITHOUT LONG-TERM CURRENT USE OF INSULIN (HCC): Primary | ICD-10-CM

## 2018-11-28 DIAGNOSIS — I10 ESSENTIAL HYPERTENSION: ICD-10-CM

## 2018-11-28 PROBLEM — Z01.818 PRE-OP EXAMINATION: Status: RESOLVED | Noted: 2018-10-23 | Resolved: 2018-11-28

## 2018-11-28 LAB — HBA1C MFR BLD: 7.2 %

## 2018-11-28 PROCEDURE — 99214 OFFICE O/P EST MOD 30 MIN: CPT | Performed by: NURSE PRACTITIONER

## 2018-11-28 PROCEDURE — 83036 HEMOGLOBIN GLYCOSYLATED A1C: CPT | Performed by: NURSE PRACTITIONER

## 2018-11-28 RX ORDER — ZOLPIDEM TARTRATE 10 MG/1
10 TABLET ORAL NIGHTLY
Qty: 90 TABLET | Refills: 0 | Status: SHIPPED | OUTPATIENT
Start: 2018-11-28 | End: 2019-02-26 | Stop reason: SDUPTHER

## 2018-11-28 RX ORDER — TRAZODONE HYDROCHLORIDE 150 MG/1
150 TABLET ORAL NIGHTLY
Qty: 30 TABLET | Refills: 0 | Status: SHIPPED | OUTPATIENT
Start: 2018-11-28 | End: 2019-02-26

## 2018-11-28 NOTE — PROGRESS NOTES
"Subjective   Ki Draper is a 58 y.o. male.     History of Present Illness   Ki Draper 58 y.o. male who presents today for routine follow up check and medication refills.  he has a history of   Patient Active Problem List   Diagnosis   • Diabetes mellitus (CMS/HCC)   • Insomnia   • Impotence of organic origin   • Obstructive sleep apnea syndrome   • Irritable bowel syndrome with diarrhea   • Primary osteoarthritis of left hip   • Benign prostatic hyperplasia with urinary frequency   • Essential hypertension   • Gastroesophageal reflux disease without esophagitis   • Hyperlipidemia   • Carpal tunnel syndrome   .  Since the last visit, he has overall felt well.  He has Hypertenision and is well controlled on medication, DMII and is well controlled on medication and insomnia is controlled, pt refusing to take cholesterol medication due to back spasms.  he has been compliant with current medications have reviewed them.  The patient denies medication side effects.    Results for orders placed or performed in visit on 11/28/18   POC Glycosylated Hemoglobin (Hb A1C)   Result Value Ref Range    Hemoglobin A1C 7.2 %         The following portions of the patient's history were reviewed and updated as appropriate: allergies, current medications, past social history and problem list.    Review of Systems   All other systems reviewed and are negative.      Objective   /74 (BP Location: Left arm, Patient Position: Sitting)   Pulse 80   Temp 98.5 °F (36.9 °C)   Ht 175.3 cm (69.02\")   Wt 104 kg (229 lb)   SpO2 99%   BMI 33.80 kg/m²   Physical Exam   Constitutional: He is oriented to person, place, and time. Vital signs are normal. He appears well-developed and well-nourished. No distress.   HENT:   Head: Normocephalic.   Cardiovascular: Normal rate, regular rhythm and normal heart sounds.   Pulmonary/Chest: Effort normal and breath sounds normal.   Neurological: He is alert and oriented to person, place, and time. " Gait normal.   Psychiatric: He has a normal mood and affect. His behavior is normal. Judgment and thought content normal.   Vitals reviewed.    The patient has read and signed the [facility] Controlled Substance Contract.  I will continue to see patient for regular follow up appointments.  They are well controlled on their medication.  LINDSAY has been reviewed by me and is updated every 3 months. The patient is aware of the potential for addiction and dependence.    Assessment/Plan      Diagnosis Plan   1. Type 2 diabetes mellitus without complication, without long-term current use of insulin (CMS/[facility])  POC Glycosylated Hemoglobin (Hb A1C)   2. Insomnia, unspecified type  zolpidem (AMBIEN) 10 MG tablet   3. Essential hypertension     4. Hyperlipidemia, unspecified hyperlipidemia type       rto in 3 mons   Even though Ambien works he would like to try a different medication for insomnia so he doesn't have to come is as often.  Sent over trazodone to try and he is aware not to take with lorri Sexton, APRN  11/28/2018

## 2019-01-07 ENCOUNTER — TELEPHONE (OUTPATIENT)
Dept: FAMILY MEDICINE CLINIC | Facility: CLINIC | Age: 59
End: 2019-01-07

## 2019-01-07 NOTE — TELEPHONE ENCOUNTER
Patient called concerned about the recall for the losartan he is taking 25mg patient can be reached [phone]

## 2019-01-08 RX ORDER — LISINOPRIL 20 MG/1
20 TABLET ORAL DAILY
Qty: 90 TABLET | Refills: 3 | Status: SHIPPED | OUTPATIENT
Start: 2019-01-08 | End: 2020-02-20

## 2019-02-26 ENCOUNTER — OFFICE VISIT (OUTPATIENT)
Dept: FAMILY MEDICINE CLINIC | Facility: CLINIC | Age: 59
End: 2019-02-26

## 2019-02-26 VITALS
DIASTOLIC BLOOD PRESSURE: 78 MMHG | TEMPERATURE: 97.8 F | HEART RATE: 79 BPM | SYSTOLIC BLOOD PRESSURE: 112 MMHG | WEIGHT: 229 LBS | OXYGEN SATURATION: 99 % | BODY MASS INDEX: 33.92 KG/M2 | HEIGHT: 69 IN

## 2019-02-26 DIAGNOSIS — E78.5 HYPERLIPIDEMIA, UNSPECIFIED HYPERLIPIDEMIA TYPE: ICD-10-CM

## 2019-02-26 DIAGNOSIS — I10 ESSENTIAL HYPERTENSION: ICD-10-CM

## 2019-02-26 DIAGNOSIS — E11.9 TYPE 2 DIABETES MELLITUS WITHOUT COMPLICATION, WITHOUT LONG-TERM CURRENT USE OF INSULIN (HCC): Primary | ICD-10-CM

## 2019-02-26 DIAGNOSIS — Z12.5 SPECIAL SCREENING FOR MALIGNANT NEOPLASM OF PROSTATE: ICD-10-CM

## 2019-02-26 DIAGNOSIS — Z13.0 SCREENING FOR IRON DEFICIENCY ANEMIA: ICD-10-CM

## 2019-02-26 DIAGNOSIS — G47.00 INSOMNIA, UNSPECIFIED TYPE: ICD-10-CM

## 2019-02-26 DIAGNOSIS — K21.9 GASTROESOPHAGEAL REFLUX DISEASE WITHOUT ESOPHAGITIS: ICD-10-CM

## 2019-02-26 LAB
ALBUMIN SERPL-MCNC: 4.5 G/DL (ref 3.5–5.2)
ALBUMIN/GLOB SERPL: 1.7 G/DL
ALP SERPL-CCNC: 62 U/L (ref 39–117)
ALT SERPL-CCNC: 11 U/L (ref 1–41)
AST SERPL-CCNC: 13 U/L (ref 1–40)
BASOPHILS # BLD AUTO: 0.09 10*3/MM3 (ref 0–0.2)
BASOPHILS NFR BLD AUTO: 1.3 % (ref 0–1.5)
BILIRUB SERPL-MCNC: 1.1 MG/DL (ref 0.1–1.2)
BUN SERPL-MCNC: 12 MG/DL (ref 6–20)
BUN/CREAT SERPL: 12.9 (ref 7–25)
CALCIUM SERPL-MCNC: 9.7 MG/DL (ref 8.6–10.5)
CHLORIDE SERPL-SCNC: 102 MMOL/L (ref 98–107)
CHOLEST SERPL-MCNC: 252 MG/DL (ref 0–200)
CO2 SERPL-SCNC: 24.1 MMOL/L (ref 22–29)
CREAT SERPL-MCNC: 0.93 MG/DL (ref 0.76–1.27)
EOSINOPHIL # BLD AUTO: 0.22 10*3/MM3 (ref 0–0.4)
EOSINOPHIL NFR BLD AUTO: 3.1 % (ref 0.3–6.2)
ERYTHROCYTE [DISTWIDTH] IN BLOOD BY AUTOMATED COUNT: 14.2 % (ref 12.3–15.4)
GLOBULIN SER CALC-MCNC: 2.6 GM/DL
GLUCOSE SERPL-MCNC: 154 MG/DL (ref 65–99)
HBA1C MFR BLD: 6.9 %
HCT VFR BLD AUTO: 49 % (ref 37.5–51)
HDLC SERPL-MCNC: 40 MG/DL (ref 40–60)
HGB BLD-MCNC: 15.7 G/DL (ref 13–17.7)
IMM GRANULOCYTES # BLD AUTO: 0.04 10*3/MM3 (ref 0–0.05)
IMM GRANULOCYTES NFR BLD AUTO: 0.6 % (ref 0–0.5)
LDLC SERPL CALC-MCNC: 151 MG/DL (ref 0–100)
LDLC/HDLC SERPL: 3.78 {RATIO}
LYMPHOCYTES # BLD AUTO: 1.58 10*3/MM3 (ref 0.7–3.1)
LYMPHOCYTES NFR BLD AUTO: 22.5 % (ref 19.6–45.3)
MCH RBC QN AUTO: 29.6 PG (ref 26.6–33)
MCHC RBC AUTO-ENTMCNC: 32 G/DL (ref 31.5–35.7)
MCV RBC AUTO: 92.3 FL (ref 79–97)
MONOCYTES # BLD AUTO: 0.55 10*3/MM3 (ref 0.1–0.9)
MONOCYTES NFR BLD AUTO: 7.8 % (ref 5–12)
NEUTROPHILS # BLD AUTO: 4.54 10*3/MM3 (ref 1.4–7)
NEUTROPHILS NFR BLD AUTO: 64.7 % (ref 42.7–76)
NRBC BLD AUTO-RTO: 0.1 /100 WBC (ref 0–0)
PLATELET # BLD AUTO: 306 10*3/MM3 (ref 140–450)
POTASSIUM SERPL-SCNC: 4.7 MMOL/L (ref 3.5–5.2)
PROT SERPL-MCNC: 7.1 G/DL (ref 6–8.5)
PSA SERPL-MCNC: 1.99 NG/ML (ref 0–4)
RBC # BLD AUTO: 5.31 10*6/MM3 (ref 4.14–5.8)
SODIUM SERPL-SCNC: 139 MMOL/L (ref 136–145)
TRIGL SERPL-MCNC: 305 MG/DL (ref 0–150)
VLDLC SERPL CALC-MCNC: 61 MG/DL (ref 5–40)
WBC # BLD AUTO: 7.02 10*3/MM3 (ref 3.4–10.8)

## 2019-02-26 PROCEDURE — 99214 OFFICE O/P EST MOD 30 MIN: CPT | Performed by: NURSE PRACTITIONER

## 2019-02-26 RX ORDER — ZOLPIDEM TARTRATE 10 MG/1
10 TABLET ORAL NIGHTLY
Qty: 90 TABLET | Refills: 0 | Status: SHIPPED | OUTPATIENT
Start: 2019-02-26 | End: 2019-05-24 | Stop reason: SDUPTHER

## 2019-02-26 NOTE — PROGRESS NOTES
"Subjective   Ki Draper is a 58 y.o. male.     History of Present Illness   Ki Draper 58 y.o. male who presents today for routine follow up check and medication refills.  he has a history of   Patient Active Problem List   Diagnosis   • Diabetes mellitus (CMS/HCC)   • Insomnia   • Impotence of organic origin   • Obstructive sleep apnea syndrome   • Irritable bowel syndrome with diarrhea   • Primary osteoarthritis of left hip   • Benign prostatic hyperplasia with urinary frequency   • Essential hypertension   • Gastroesophageal reflux disease without esophagitis   • Hyperlipidemia   • Special screening for malignant neoplasm of prostate   • Carpal tunnel syndrome   • Screening for iron deficiency anemia   .  Since the last visit, he has overall felt well.  He has Hypertenision and is well controlled on medication, DMII and is well controlled on medication, GERD and is well controlled on PPI medication, Hyperlipidemia and working on this with diet and exercise and insomnia controlled with Ambien, Trazodone didn't work.  He is intolerate to statins and Zetia caused him a rash.  he has been compliant with current medications have reviewed them.  The patient denies medication side effects.    Results for orders placed or performed in visit on 11/28/18   POC Glycosylated Hemoglobin (Hb A1C)   Result Value Ref Range    Hemoglobin A1C 7.2 %         The following portions of the patient's history were reviewed and updated as appropriate: allergies, current medications, past social history and problem list.    Review of Systems   All other systems reviewed and are negative.      Objective   /78   Pulse 79   Temp 97.8 °F (36.6 °C) (Oral)   Ht 175.3 cm (69\")   Wt 104 kg (229 lb)   SpO2 99%   BMI 33.82 kg/m²   Physical Exam   Constitutional: He is oriented to person, place, and time. Vital signs are normal. He appears well-developed and well-nourished. No distress.   HENT:   Head: Normocephalic. "   Cardiovascular: Normal rate, regular rhythm and normal heart sounds.   Pulmonary/Chest: Effort normal and breath sounds normal.   Neurological: He is alert and oriented to person, place, and time. Gait normal.   Psychiatric: He has a normal mood and affect. His behavior is normal. Judgment and thought content normal.   Vitals reviewed.      Assessment/Plan      Diagnosis Plan   1. Type 2 diabetes mellitus without complication, without long-term current use of insulin (CMS/[facility])  Comprehensive Metabolic Panel    SITagliptin (JANUVIA) 100 MG tablet    metFORMIN (GLUCOPHAGE) 1000 MG tablet   2. Insomnia, unspecified type  zolpidem (AMBIEN) 10 MG tablet   3. Essential hypertension     4. Gastroesophageal reflux disease without esophagitis     5. Hyperlipidemia, unspecified hyperlipidemia type  Comprehensive Metabolic Panel    Lipid Panel With LDL / HDL Ratio   6. Screening for iron deficiency anemia  CBC & Differential   7. Special screening for malignant neoplasm of prostate  PSA Screen     Follow up after labs   Cont same with sindhu Sexton, LENNIE  2/26/2019

## 2019-02-27 RX ORDER — SIMVASTATIN 20 MG
20 TABLET ORAL NIGHTLY
Qty: 90 TABLET | Refills: 3 | Status: SHIPPED | OUTPATIENT
Start: 2019-02-27 | End: 2019-05-24

## 2019-03-04 ENCOUNTER — TELEPHONE (OUTPATIENT)
Dept: FAMILY MEDICINE CLINIC | Facility: CLINIC | Age: 59
End: 2019-03-04

## 2019-03-04 NOTE — TELEPHONE ENCOUNTER
Pt's new insurance, [facility] will not cover pt's Januvia Rx. Is there another Rx Garret can authorize?

## 2019-03-05 NOTE — TELEPHONE ENCOUNTER
Left message asking patient to if he has used the copay card and to call the office back to let us know

## 2019-03-06 NOTE — TELEPHONE ENCOUNTER
Patient left message stating that he tried using the januvia copay card and the pharmacy told him that the card needed insurance approval before it could be used as a copay card

## 2019-03-06 NOTE — TELEPHONE ENCOUNTER
Spoke to patient and told him to contact his insurance to see what they will cover that is in the same class as the januvia and call and let tabby know

## 2019-03-08 NOTE — TELEPHONE ENCOUNTER
patient called and said he spoke with his insurance and they told him that an appeal needed to be submitted stating the medications he has tried and failed on the tier plan and why the januvia is medically necessary. I asked patient if they told him what the medications are on their tier plan or what others they cover and he said they did not give him names of any other medications.

## 2019-03-08 NOTE — TELEPHONE ENCOUNTER
Received information from patient insurance tabby prescribed tradjenta prescription sent to pharmacy left message letting patient know

## 2019-04-01 DIAGNOSIS — N52.9 IMPOTENCE OF ORGANIC ORIGIN: ICD-10-CM

## 2019-04-01 RX ORDER — SILDENAFIL CITRATE 20 MG/1
TABLET ORAL
Qty: 30 TABLET | Refills: 4 | Status: SHIPPED | OUTPATIENT
Start: 2019-04-01 | End: 2019-09-23 | Stop reason: SDUPTHER

## 2019-05-24 ENCOUNTER — OFFICE VISIT (OUTPATIENT)
Dept: FAMILY MEDICINE CLINIC | Facility: CLINIC | Age: 59
End: 2019-05-24

## 2019-05-24 VITALS
BODY MASS INDEX: 33.33 KG/M2 | DIASTOLIC BLOOD PRESSURE: 70 MMHG | WEIGHT: 225 LBS | HEART RATE: 70 BPM | HEIGHT: 69 IN | SYSTOLIC BLOOD PRESSURE: 112 MMHG | TEMPERATURE: 98 F | OXYGEN SATURATION: 100 %

## 2019-05-24 DIAGNOSIS — G47.00 INSOMNIA, UNSPECIFIED TYPE: ICD-10-CM

## 2019-05-24 PROCEDURE — 99213 OFFICE O/P EST LOW 20 MIN: CPT | Performed by: NURSE PRACTITIONER

## 2019-05-24 RX ORDER — ZOLPIDEM TARTRATE 10 MG/1
10 TABLET ORAL NIGHTLY
Qty: 90 TABLET | Refills: 0 | Status: SHIPPED | OUTPATIENT
Start: 2019-05-24 | End: 2019-08-26 | Stop reason: SDUPTHER

## 2019-05-24 RX ORDER — DOXEPIN HYDROCHLORIDE 10 MG/1
10 CAPSULE ORAL NIGHTLY
Qty: 14 CAPSULE | Refills: 0 | Status: SHIPPED | OUTPATIENT
Start: 2019-05-24 | End: 2019-11-26

## 2019-05-24 NOTE — PROGRESS NOTES
"Subjective   Ki Draper is a 58 y.o. male.     History of Present Illness   Ki Draper 58 y.o. male presents for follow up of insomnia with onset of symptoms years ago. Patient describes symptoms as frequent night time awakening and difficulty falling asleep. Patient has found complete relief with Ambien (Zolpidem). Associated symptoms include: fatigue if unable to take Rx . Patient denies daytime somnolence Symptoms have stabilized.  The patient has failed multiple OTC medications for insomnia.  They are well controlled on current Rx and will continue to try to take Rx PRN.  He will use the lowest effective dose.  The patient has read and signed the [facility] Controlled Substance Contract.  I will continue to see patient for regular follow up appointments and be prescribed the lowest effective dose.  LINDSAY has been reviewed by me and is updated every 3 months. The patient is aware of the potential for addiction and dependence.  He denies that Ambien (Zolpidem) causes excessive daytime drowsiness and sleep walking.  Patient voices understanding to take Ambien (Zolpidem) and go straight to bed. Patient must be able to sleep 7 hours or more when taking this.  Patient will hold Rx and contact me if they experience any impaired mental alertness the next day.          The following portions of the patient's history were reviewed and updated as appropriate: allergies, current medications, past social history and problem list.    Review of Systems   Constitutional: Negative for fever.   Respiratory: Negative for cough and shortness of breath.    Cardiovascular: Negative for chest pain.   Gastrointestinal: Negative for abdominal pain.   Neurological: Negative for dizziness.       Objective   /70 (BP Location: Right arm, Patient Position: Sitting)   Pulse 70   Temp 98 °F (36.7 °C)   Ht 175.3 cm (69.02\")   Wt 102 kg (225 lb)   SpO2 100%   BMI 33.21 kg/m²   Physical Exam   Constitutional: He is oriented " to person, place, and time. Vital signs are normal. He appears well-developed and well-nourished. No distress.   HENT:   Head: Normocephalic.   Cardiovascular: Normal rate, regular rhythm and normal heart sounds.   Pulmonary/Chest: Effort normal and breath sounds normal.   Neurological: He is alert and oriented to person, place, and time. Gait normal.   Psychiatric: He has a normal mood and affect. His behavior is normal. Judgment and thought content normal.   Vitals reviewed.      Assessment/Plan      Diagnosis Plan   1. Insomnia, unspecified type  zolpidem (AMBIEN) 10 MG tablet    doxepin (SINEquan) 10 MG capsule     rto in 3 mons   States that the Ambien is working well but he does not like to have to come in every 3 months because his controlled substance.  We talked about other options and he is going to try the doxepin he understands not to take it with the Ambien he also understands that he will need at least a 3 night washout.  After stopping the Ambien.  He is going to call if it does or does not work and if it does not we can try trazodone.  Garret Sexton, APRN  5/24/2019

## 2019-05-28 RX ORDER — EMPAGLIFLOZIN 10 MG/1
TABLET, FILM COATED ORAL
Qty: 30 TABLET | Refills: 2 | Status: SHIPPED | OUTPATIENT
Start: 2019-05-28 | End: 2019-08-25 | Stop reason: SDUPTHER

## 2019-08-26 ENCOUNTER — OFFICE VISIT (OUTPATIENT)
Dept: FAMILY MEDICINE CLINIC | Facility: CLINIC | Age: 59
End: 2019-08-26

## 2019-08-26 VITALS
SYSTOLIC BLOOD PRESSURE: 124 MMHG | DIASTOLIC BLOOD PRESSURE: 82 MMHG | OXYGEN SATURATION: 99 % | HEART RATE: 79 BPM | WEIGHT: 230.4 LBS | HEIGHT: 69 IN | BODY MASS INDEX: 34.13 KG/M2

## 2019-08-26 DIAGNOSIS — R07.9 CHEST PAIN, UNSPECIFIED TYPE: ICD-10-CM

## 2019-08-26 DIAGNOSIS — E11.9 TYPE 2 DIABETES MELLITUS WITHOUT COMPLICATION, WITHOUT LONG-TERM CURRENT USE OF INSULIN (HCC): Primary | ICD-10-CM

## 2019-08-26 DIAGNOSIS — E78.5 HYPERLIPIDEMIA, UNSPECIFIED HYPERLIPIDEMIA TYPE: ICD-10-CM

## 2019-08-26 DIAGNOSIS — K21.9 GASTROESOPHAGEAL REFLUX DISEASE WITHOUT ESOPHAGITIS: ICD-10-CM

## 2019-08-26 DIAGNOSIS — B35.9 RINGWORM: ICD-10-CM

## 2019-08-26 DIAGNOSIS — I10 ESSENTIAL HYPERTENSION: ICD-10-CM

## 2019-08-26 DIAGNOSIS — G47.00 INSOMNIA, UNSPECIFIED TYPE: ICD-10-CM

## 2019-08-26 PROBLEM — Z12.11 COLON CANCER SCREENING: Status: ACTIVE | Noted: 2019-02-26

## 2019-08-26 PROBLEM — Z13.0 SCREENING FOR IRON DEFICIENCY ANEMIA: Status: RESOLVED | Noted: 2019-02-26 | Resolved: 2019-08-26

## 2019-08-26 PROCEDURE — 99214 OFFICE O/P EST MOD 30 MIN: CPT | Performed by: NURSE PRACTITIONER

## 2019-08-26 PROCEDURE — 93000 ELECTROCARDIOGRAM COMPLETE: CPT | Performed by: NURSE PRACTITIONER

## 2019-08-26 RX ORDER — ZOLPIDEM TARTRATE 10 MG/1
10 TABLET ORAL NIGHTLY
Qty: 90 TABLET | Refills: 0 | Status: SHIPPED | OUTPATIENT
Start: 2019-08-26 | End: 2019-11-20 | Stop reason: SDUPTHER

## 2019-08-26 RX ORDER — EMPAGLIFLOZIN 10 MG/1
TABLET, FILM COATED ORAL
Qty: 30 TABLET | Refills: 1 | Status: SHIPPED | OUTPATIENT
Start: 2019-08-26 | End: 2019-10-21 | Stop reason: SDUPTHER

## 2019-08-26 NOTE — PROGRESS NOTES
Subjective   Ki Draper is a 59 y.o. male.     History of Present Illness   Ki Draper 59 y.o. male presents for follow up of insomnia with onset of symptoms years ago. Patient describes symptoms as frequent night time awakening and difficulty falling asleep. Patient has found complete relief with Ambien (Zolpidem). Associated symptoms include: fatigue if unable to take Rx . Patient denies daytime somnolence Symptoms have stabilized.  The patient has failed multiple OTC medications for insomnia.  They are well controlled on current Rx and will continue to try to take Rx PRN.  He will use the lowest effective dose.  The patient has read and signed the [facility] Controlled Substance Contract.  I will continue to see patient for regular follow up appointments and be prescribed the lowest effective dose.  LINDSAY has been reviewed by me and is updated every 3 months. The patient is aware of the potential for addiction and dependence.  He denies that Ambien (Zolpidem) causes excessive daytime drowsiness and sleep walking.  Patient voices understanding to take Ambien (Zolpidem) and go straight to bed. Patient must be able to sleep 7 hours or more when taking this.  Patient will hold Rx and contact me if they experience any impaired mental alertness the next day.      Pt did not start Simvastatin due to side effects from past cholesterol meds.  He got a rash from Zetia and the statins caused muscle issues.     Pt states that when he climb stairs he gets winded and lightheaded.  Sometimes will have chest/shoulder tightness.  He has had a cardio workup but hasn't for at least 4-5 years.  He is not working out or doing heart healthy diet.     Has a red single ring rash under left arm for the past 3 weeks.  Hasn't used any cream on it doesn't bother him and hasn't changed.    Taking diabetic meds as directed. Last A1c was in Feb and 7.2. No issues with meds  Fasting today for labs      Using prevacid daily but unsure  "if on 15mg or 30mg.  Having increase in belching, bloating and gas for a while.  Not up to date on colonoscopy.  Sees Dr. Griffin for GI issues but hasn't in 2 years.    The following portions of the patient's history were reviewed and updated as appropriate: allergies, current medications, past social history and problem list.    Review of Systems   Skin:        Lump behind ear   All other systems reviewed and are negative.      Objective   /82   Pulse 79   Ht 175.3 cm (69.02\")   Wt 105 kg (230 lb 6.4 oz)   SpO2 99%   BMI 34.00 kg/m²   Physical Exam   Constitutional: He is oriented to person, place, and time. Vital signs are normal. He appears well-developed and well-nourished. No distress.   HENT:   Head: Normocephalic.   Cardiovascular: Normal rate, regular rhythm and normal heart sounds.   Pulmonary/Chest: Effort normal and breath sounds normal.   Neurological: He is alert and oriented to person, place, and time. Gait normal.   Psychiatric: He has a normal mood and affect. His behavior is normal. Judgment and thought content normal.   Vitals reviewed.    The patient has read and signed the [facility] Controlled Substance Contract.  I will continue to see patient for regular follow up appointments.  They are well controlled on their medication.  LINDSAY has been reviewed by me and is updated every 3 months. The patient is aware of the potential for addiction and dependence.        ECG 12 Lead  Date/Time: 8/26/2019 8:05 AM  Performed by: Garret Sexton APRN  Authorized by: Garret Sexton APRN   Comparison: not compared with previous ECG   Previous ECG: no previous ECG available  Rhythm: sinus rhythm  Rate: normal  Conduction: conduction normal  ST Segments: ST segments normal  T Waves: T waves normal  Other: no other findings    Clinical impression: normal ECG            Assessment/Plan      Diagnosis Plan   1. Type 2 diabetes mellitus without complication, without long-term current use of insulin " (CMS/[facility])  Hemoglobin A1c   2. Insomnia, unspecified type  zolpidem (AMBIEN) 10 MG tablet   3. Hyperlipidemia, unspecified hyperlipidemia type  Lipid Panel With LDL / HDL Ratio    High Sensitivity CRP   4. Gastroesophageal reflux disease without esophagitis     5. Essential hypertension     6. Ringworm     7. Chest pain, unspecified type  ECG 12 Lead    Ambulatory Referral to Cardiology     rto in 3 mons  Discussed changing diet to help with cholesterol but he is unwilling to change diet.  Not exercising and not willing.      Get otc fungal cream for rash and rto if no improvement    Referral to cardio for heart workup    Follow up after labs  Cont with meds  Work on exercise and heart healthy diet   Follow up with Dr cochran for colonoscopy and GI issues.  Increase prevacid to 30mg if not already on that dose  Garret Sexton, APRN  8/26/2019

## 2019-08-27 LAB
CHOLEST SERPL-MCNC: 240 MG/DL (ref 0–200)
CRP SERPL HS-MCNC: 1.41 MG/L (ref 0–3)
HBA1C MFR BLD: 7.4 % (ref 4.8–5.6)
HDLC SERPL-MCNC: 33 MG/DL (ref 40–60)
LDLC SERPL CALC-MCNC: ABNORMAL MG/DL
LDLC/HDLC SERPL: ABNORMAL {RATIO}
TRIGL SERPL-MCNC: 444 MG/DL (ref 0–150)
VLDLC SERPL CALC-MCNC: ABNORMAL MG/DL

## 2019-09-04 RX ORDER — SIMVASTATIN 20 MG
20 TABLET ORAL NIGHTLY
Qty: 90 TABLET | Refills: 3 | Status: SHIPPED | OUTPATIENT
Start: 2019-09-04 | End: 2020-08-13 | Stop reason: SDUPTHER

## 2019-09-06 ENCOUNTER — OFFICE VISIT (OUTPATIENT)
Dept: CARDIOLOGY | Facility: CLINIC | Age: 59
End: 2019-09-06

## 2019-09-06 VITALS
DIASTOLIC BLOOD PRESSURE: 82 MMHG | BODY MASS INDEX: 33.33 KG/M2 | HEIGHT: 69 IN | HEART RATE: 82 BPM | RESPIRATION RATE: 16 BRPM | WEIGHT: 225 LBS | OXYGEN SATURATION: 99 % | SYSTOLIC BLOOD PRESSURE: 124 MMHG

## 2019-09-06 DIAGNOSIS — R07.89 ATYPICAL CHEST PAIN: Primary | ICD-10-CM

## 2019-09-06 DIAGNOSIS — E78.5 HYPERLIPIDEMIA, UNSPECIFIED HYPERLIPIDEMIA TYPE: ICD-10-CM

## 2019-09-06 DIAGNOSIS — I10 ESSENTIAL HYPERTENSION: ICD-10-CM

## 2019-09-06 DIAGNOSIS — E11.9 TYPE 2 DIABETES MELLITUS WITHOUT COMPLICATION, WITHOUT LONG-TERM CURRENT USE OF INSULIN (HCC): ICD-10-CM

## 2019-09-06 PROCEDURE — 93000 ELECTROCARDIOGRAM COMPLETE: CPT | Performed by: INTERNAL MEDICINE

## 2019-09-06 PROCEDURE — 99204 OFFICE O/P NEW MOD 45 MIN: CPT | Performed by: INTERNAL MEDICINE

## 2019-09-06 RX ORDER — LANSOPRAZOLE 15 MG/1
15 CAPSULE, DELAYED RELEASE ORAL DAILY
COMMUNITY

## 2019-09-06 NOTE — PROGRESS NOTES
[facility]      Patient Name: Ki Draper  :1960  Age: 59 y.o.  Encounter Provider:  Ham Carmona Jr, MD      Chief Complaint: No chief complaint on file.        HPI  Ki Draper is a 59 y.o. male past medical history of diabetes, hypertension and hyperlipidemia who presents for evaluation of chest pain.  Patient has been complaining of sharp left-sided chest pains that come and go without relationship to exertion or rest.  He notes increasing exertional dyspnea and decreased exercise tolerance.  Denies any dizziness palpitations or syncope.  No orthopnea PND or edema.  He has a strong family history of father with heart attack in his 50s and brother with heart attack and need for bypass surgery in his early 60s.  He denies tobacco alcohol or illicit drug use.  He is tolerating oral current medications well.      The following portions of the patient's history were reviewed and updated as appropriate: allergies, current medications, past family history, past medical history, past social history, past surgical history and problem list.      Review of Systems   Constitution: Negative for chills and fever.   HENT: Negative for hoarse voice and sore throat.    Eyes: Negative for double vision and photophobia.   Cardiovascular: Positive for chest pain and dyspnea on exertion. Negative for leg swelling, near-syncope, orthopnea, palpitations, paroxysmal nocturnal dyspnea and syncope.   Respiratory: Negative for cough and wheezing.    Skin: Negative for poor wound healing and rash.   Musculoskeletal: Negative for arthritis and joint swelling.   Gastrointestinal: Negative for bloating, abdominal pain, hematemesis and hematochezia.   Neurological: Negative for dizziness and focal weakness.   Psychiatric/Behavioral: Negative for depression and suicidal ideas.       OBJECTIVE:   Vital Signs  Vitals:    19 1455   BP: 124/82   Pulse: 82   Resp: 16   SpO2: 99%     Estimated body mass  "index is 33.23 kg/m² as calculated from the following:    Height as of this encounter: 175.3 cm (69\").    Weight as of this encounter: 102 kg (225 lb).    Physical Exam   Constitutional: He is oriented to person, place, and time. He appears well-developed and well-nourished.   HENT:   Head: Normocephalic and atraumatic.   Eyes: Conjunctivae are normal. Pupils are equal, round, and reactive to light.   Neck: No JVD present. No thyromegaly present.   Cardiovascular: Exam reveals no gallop and no friction rub.   No murmur heard.  Pulmonary/Chest: No respiratory distress. He exhibits no tenderness.   Abdominal: Bowel sounds are normal. He exhibits no distension.   Musculoskeletal: He exhibits no edema or tenderness.   Neurological: He is alert and oriented to person, place, and time.   Skin: No rash noted. No erythema.   Psychiatric: He has a normal mood and affect. Judgment normal.   Vitals reviewed.        ECG 12 Lead  Date/Time: 9/6/2019 3:20 PM  Performed by: Ham Carmona Jr., MD  Authorized by: Ham Carmona Jr., MD   Comparison: compared with previous ECG from 10/24/2018  Comparison to previous ECG: PVC is now present  Rhythm: sinus rhythm  Ectopy: unifocal PVCs    Clinical impression: abnormal EKG                  ASSESSMENT:      Diagnosis Plan   1. Atypical chest pain     2. Type 2 diabetes mellitus without complication, without long-term current use of insulin (CMS/[facility])     3. Essential hypertension     4. Hyperlipidemia, unspecified hyperlipidemia type           PLAN OF CARE:     1. Atypical chest pain -combined with exertional dyspnea in this high risk patient will require screening study.  Will perform stress echo.  He should likely be on aspirin for primary prevention we will get little lipid surveillance from primary care office.  2. Hypertension -seemingly well controlled.  Check twice daily blood pressure log.  Advised on sodium and volume restriction.  Continue current regimen.  3. Hyperlipidemia " -we will request records from primary care office.  4. Obstructive sleep apnea -compliant with CPAP    Return to clinic 6 months             Discharge Medications           Accurate as of 9/6/19  3:17 PM. If you have any questions, ask your nurse or doctor.               Continue These Medications      Instructions Start Date   doxepin 10 MG capsule  Commonly known as:  SINEquan   10 mg, Oral, Nightly      fish oil 1000 MG capsule capsule   Oral, Daily With Breakfast      JARDIANCE 10 MG tablet  Generic drug:  Empagliflozin   TAKE ONE TABLET BY MOUTH DAILY      lansoprazole 15 MG capsule  Commonly known as:  PREVACID   15 mg, Oral, Daily      lisinopril 20 MG tablet  Commonly known as:  PRINIVIL,ZESTRIL   20 mg, Oral, Daily      metFORMIN 1000 MG tablet  Commonly known as:  GLUCOPHAGE   1,000 mg, Oral, 2 Times Daily With Meals      MULTI VITAMIN PO   Oral      sildenafil 20 MG tablet  Commonly known as:  REVATIO   TAKE 2-3 TABLETS BY MOUTH 1 HOUR PRIOR TO ACTIVITY      simvastatin 20 MG tablet  Commonly known as:  ZOCOR   20 mg, Oral, Nightly      tamsulosin 0.4 MG capsule 24 hr capsule  Commonly known as:  FLOMAX   1 capsule, Oral, Daily      TRADJENTA 5 MG tablet tablet  Generic drug:  linagliptin   No dose, route, or frequency recorded.      VITAMIN B 12 PO   Oral      zolpidem 10 MG tablet  Commonly known as:  AMBIEN   10 mg, Oral, Nightly             Thank you for allowing me to participate in the care of your patient,      Sincerely,   Ham Carmona Jr, MD  [facility]  09/06/19  3:17 PM

## 2019-09-12 ENCOUNTER — HOSPITAL ENCOUNTER (OUTPATIENT)
Dept: CARDIOLOGY | Facility: HOSPITAL | Age: 59
Discharge: HOME | End: 2019-09-12
Admitting: INTERNAL MEDICINE

## 2019-09-12 VITALS
SYSTOLIC BLOOD PRESSURE: 128 MMHG | HEART RATE: 79 BPM | HEIGHT: 69 IN | BODY MASS INDEX: 33.33 KG/M2 | DIASTOLIC BLOOD PRESSURE: 82 MMHG | WEIGHT: 225 LBS

## 2019-09-12 DIAGNOSIS — R07.89 ATYPICAL CHEST PAIN: ICD-10-CM

## 2019-09-12 PROCEDURE — 93325 DOPPLER ECHO COLOR FLOW MAPG: CPT | Performed by: INTERNAL MEDICINE

## 2019-09-12 PROCEDURE — 93350 STRESS TTE ONLY: CPT

## 2019-09-12 PROCEDURE — 93320 DOPPLER ECHO COMPLETE: CPT

## 2019-09-12 PROCEDURE — 93320 DOPPLER ECHO COMPLETE: CPT | Performed by: INTERNAL MEDICINE

## 2019-09-12 PROCEDURE — 93018 CV STRESS TEST I&R ONLY: CPT | Performed by: INTERNAL MEDICINE

## 2019-09-12 PROCEDURE — 93017 CV STRESS TEST TRACING ONLY: CPT

## 2019-09-12 PROCEDURE — 93325 DOPPLER ECHO COLOR FLOW MAPG: CPT

## 2019-09-12 PROCEDURE — 93350 STRESS TTE ONLY: CPT | Performed by: INTERNAL MEDICINE

## 2019-09-12 PROCEDURE — 93016 CV STRESS TEST SUPVJ ONLY: CPT | Performed by: INTERNAL MEDICINE

## 2019-09-13 LAB
AORTIC ROOT ANNULUS: 2.2 CM
ASCENDING AORTA: 3.2 CM
BH CV ECHO MEAS - ACS: 2 CM
BH CV ECHO MEAS - AO MAX PG: 8.6 MMHG
BH CV ECHO MEAS - AO V2 MAX: 146.4 CM/SEC
BH CV ECHO MEAS - BSA(HAYCOCK): 2.3 M^2
BH CV ECHO MEAS - BSA: 2.2 M^2
BH CV ECHO MEAS - BZI_BMI: 33.2 KILOGRAMS/M^2
BH CV ECHO MEAS - BZI_METRIC_HEIGHT: 175.3 CM
BH CV ECHO MEAS - BZI_METRIC_WEIGHT: 102.1 KG
BH CV ECHO MEAS - EDV(MOD-SP4): 58 ML
BH CV ECHO MEAS - EDV(TEICH): 117.9 ML
BH CV ECHO MEAS - EF(CUBED): 70 %
BH CV ECHO MEAS - EF(MOD-BP): 57 %
BH CV ECHO MEAS - EF(MOD-SP4): 69 %
BH CV ECHO MEAS - EF(TEICH): 61.4 %
BH CV ECHO MEAS - ESV(MOD-SP4): 18 ML
BH CV ECHO MEAS - ESV(TEICH): 45.5 ML
BH CV ECHO MEAS - FS: 33.1 %
BH CV ECHO MEAS - IVS/LVPW: 1.1
BH CV ECHO MEAS - IVSD: 0.92 CM
BH CV ECHO MEAS - LAT PEAK E' VEL: 9 CM/SEC
BH CV ECHO MEAS - LV DIASTOLIC VOL/BSA (35-75): 26.7 ML/M^2
BH CV ECHO MEAS - LV MASS(C)D: 154.1 GRAMS
BH CV ECHO MEAS - LV MASS(C)DI: 70.9 GRAMS/M^2
BH CV ECHO MEAS - LV SYSTOLIC VOL/BSA (12-30): 8.3 ML/M^2
BH CV ECHO MEAS - LVIDD: 5 CM
BH CV ECHO MEAS - LVIDS: 3.3 CM
BH CV ECHO MEAS - LVLD AP4: 7 CM
BH CV ECHO MEAS - LVLS AP4: 6.1 CM
BH CV ECHO MEAS - LVPWD: 0.85 CM
BH CV ECHO MEAS - MED PEAK E' VEL: 9 CM/SEC
BH CV ECHO MEAS - MV A DUR: 0.11 SEC
BH CV ECHO MEAS - MV A MAX VEL: 80 CM/SEC
BH CV ECHO MEAS - MV DEC SLOPE: 242.9 CM/SEC^2
BH CV ECHO MEAS - MV DEC TIME: 0.27 SEC
BH CV ECHO MEAS - MV E MAX VEL: 65.5 CM/SEC
BH CV ECHO MEAS - MV E/A: 0.82
BH CV ECHO MEAS - MV P1/2T MAX VEL: 68.9 CM/SEC
BH CV ECHO MEAS - MV P1/2T: 83.1 MSEC
BH CV ECHO MEAS - MVA P1/2T LCG: 3.2 CM^2
BH CV ECHO MEAS - MVA(P1/2T): 2.6 CM^2
BH CV ECHO MEAS - PULM A REVS DUR: 0.09 SEC
BH CV ECHO MEAS - PULM A REVS VEL: 41.7 CM/SEC
BH CV ECHO MEAS - PULM DIAS VEL: 44.1 CM/SEC
BH CV ECHO MEAS - PULM S/D: 1.3
BH CV ECHO MEAS - PULM SYS VEL: 58.7 CM/SEC
BH CV ECHO MEAS - SI(CUBED): 40.1 ML/M^2
BH CV ECHO MEAS - SI(MOD-SP4): 18.4 ML/M^2
BH CV ECHO MEAS - SI(TEICH): 33.3 ML/M^2
BH CV ECHO MEAS - SV(CUBED): 87.2 ML
BH CV ECHO MEAS - SV(MOD-SP4): 40 ML
BH CV ECHO MEAS - SV(TEICH): 72.4 ML
BH CV ECHO MEAS - TAPSE (>1.6): 1.4 CM2
BH CV ECHO MEASUREMENTS AVERAGE E/E' RATIO: 7.28
BH CV STRESS BP STAGE 1: NORMAL
BH CV STRESS BP STAGE 2: NORMAL
BH CV STRESS DURATION MIN STAGE 1: 3
BH CV STRESS DURATION MIN STAGE 2: 3
BH CV STRESS DURATION SEC STAGE 1: 0
BH CV STRESS DURATION SEC STAGE 2: 0
BH CV STRESS ECHO POST STRESS EJECTION FRACTION EF: 70 %
BH CV STRESS GRADE STAGE 1: 10
BH CV STRESS GRADE STAGE 2: 12
BH CV STRESS HR STAGE 1: 115
BH CV STRESS HR STAGE 2: 148
BH CV STRESS METS STAGE 1: 5
BH CV STRESS METS STAGE 2: 7.5
BH CV STRESS PROTOCOL 1: NORMAL
BH CV STRESS SPEED STAGE 1: 1.7
BH CV STRESS SPEED STAGE 2: 2.5
BH CV STRESS STAGE 1: 1
BH CV STRESS STAGE 2: 2
BH CV XLRA - RV BASE: 2.9 CM
BH CV XLRA - TDI S': 13 CM/SEC
LEFT ATRIUM VOLUME INDEX: 18 ML/M2
MAXIMAL PREDICTED HEART RATE: 161 BPM
PERCENT MAX PREDICTED HR: 91.93 %
SINUS: 3.9 CM
STJ: 2.8 CM
STRESS BASELINE BP: NORMAL MMHG
STRESS BASELINE HR: 79 BPM
STRESS PERCENT HR: 108 %
STRESS POST ESTIMATED WORKLOAD: 7 METS
STRESS POST EXERCISE DUR MIN: 6 MIN
STRESS POST EXERCISE DUR SEC: 0 SEC
STRESS POST PEAK BP: NORMAL MMHG
STRESS POST PEAK HR: 148 BPM
STRESS TARGET HR: 137 BPM

## 2019-09-16 ENCOUNTER — TELEPHONE (OUTPATIENT)
Dept: CARDIOLOGY | Facility: CLINIC | Age: 59
End: 2019-09-16

## 2019-09-23 DIAGNOSIS — N52.9 IMPOTENCE OF ORGANIC ORIGIN: ICD-10-CM

## 2019-09-23 RX ORDER — SILDENAFIL CITRATE 20 MG/1
TABLET ORAL
Qty: 30 TABLET | Refills: 3 | Status: SHIPPED | OUTPATIENT
Start: 2019-09-23 | End: 2020-04-01

## 2019-10-22 RX ORDER — EMPAGLIFLOZIN 10 MG/1
TABLET, FILM COATED ORAL
Qty: 30 TABLET | Refills: 11 | Status: SHIPPED | OUTPATIENT
Start: 2019-10-22 | End: 2020-11-09

## 2019-11-20 DIAGNOSIS — G47.00 INSOMNIA, UNSPECIFIED TYPE: ICD-10-CM

## 2019-11-20 NOTE — TELEPHONE ENCOUNTER
Patients appointment had to be rescheduled due to the provider being sick. Can we please refill this for him until he can come in next week.

## 2019-11-21 RX ORDER — ZOLPIDEM TARTRATE 10 MG/1
10 TABLET ORAL NIGHTLY
Qty: 90 TABLET | Refills: 0 | OUTPATIENT
Start: 2019-11-21 | End: 2020-01-28 | Stop reason: SDUPTHER

## 2019-11-26 ENCOUNTER — OFFICE VISIT (OUTPATIENT)
Dept: FAMILY MEDICINE CLINIC | Facility: CLINIC | Age: 59
End: 2019-11-26

## 2019-11-26 VITALS
DIASTOLIC BLOOD PRESSURE: 72 MMHG | HEIGHT: 69 IN | SYSTOLIC BLOOD PRESSURE: 124 MMHG | HEART RATE: 72 BPM | WEIGHT: 226.4 LBS | OXYGEN SATURATION: 98 % | BODY MASS INDEX: 33.53 KG/M2 | TEMPERATURE: 98.2 F

## 2019-11-26 DIAGNOSIS — I10 ESSENTIAL HYPERTENSION: ICD-10-CM

## 2019-11-26 DIAGNOSIS — K21.9 GASTROESOPHAGEAL REFLUX DISEASE WITHOUT ESOPHAGITIS: ICD-10-CM

## 2019-11-26 DIAGNOSIS — E78.5 HYPERLIPIDEMIA, UNSPECIFIED HYPERLIPIDEMIA TYPE: ICD-10-CM

## 2019-11-26 DIAGNOSIS — E11.9 TYPE 2 DIABETES MELLITUS WITHOUT COMPLICATION, WITHOUT LONG-TERM CURRENT USE OF INSULIN (HCC): Primary | ICD-10-CM

## 2019-11-26 LAB — HBA1C MFR BLD: 7.3 %

## 2019-11-26 PROCEDURE — 83036 HEMOGLOBIN GLYCOSYLATED A1C: CPT | Performed by: NURSE PRACTITIONER

## 2019-11-26 PROCEDURE — 99214 OFFICE O/P EST MOD 30 MIN: CPT | Performed by: NURSE PRACTITIONER

## 2019-11-26 NOTE — PROGRESS NOTES
"Subjective   Ki Draper is a 59 y.o. male.     History of Present Illness    Since the last visit, he has overall felt well.  He has Essential Hypertension and well controlled on current medication, DMII well controlled on medication and will continue regimen, GERD controlled on PPI Rx and recheck lipids today for high trig.  he has been compliant with current medications have reviewed them.  The patient denies medication side effects.  Will refill medications. /72 (BP Location: Right arm, Patient Position: Sitting)   Pulse 72   Temp 98.2 °F (36.8 °C)   Ht 175.3 cm (69.02\")   Wt 103 kg (226 lb 6.4 oz)   SpO2 98%   BMI 33.42 kg/m²     Results for orders placed or performed during the hospital encounter of 09/12/19   Adult Stress Echo W/ Cont or Stress Agent if Necessary Per Protocol   Result Value Ref Range    TDI S' 13.00 cm/sec    RV Base 2.90 cm    Ao root annulus 2.20 cm    Sinus 3.90 cm    STJ 2.80 cm    Ascending aorta 3.20 cm    LA Volume Index 18.0 mL/m2    EF(MOD-bp) 57 %    Lat Peak E' Blanco 9.0 cm/sec    Med Peak E' Blanco 9.00 cm/sec    TAPSE (>1.6) 1.40 cm2    BH CV STRESS PROTOCOL 1 Jason     Stage 1 1     BP Stage 1 142/80     Duration Min Stage 1 3     Duration Sec Stage 1 0     Grade Stage 1 10     Speed Stage 1 1.7     BH CV STRESS METS STAGE 1 5     Stage 2 2     BP Stage 2 148/80     Duration Min Stage 2 3     Duration Sec Stage 2 0     Grade Stage 2 12     Speed Stage 2 2.5     BH CV STRESS METS STAGE 2 7.5     Baseline HR 79 bpm    Baseline /82 mmHg    HR Stage 1 115     HR Stage 2 148     Peak  bpm    Peak /80 mmHg    Exercise duration (min) 6 min    Exercise duration (sec) 0 sec    BSA 2.2 m^2    IVSd 0.92 cm    LVIDd 5.0 cm    LVIDs 3.3 cm    LVPWd 0.85 cm    IVS/LVPW 1.1     FS 33.1 %    EDV(Teich) 117.9 ml    ESV(Teich) 45.5 ml    EF(Teich) 61.4 %    EF(cubed) 70.0 %    LV mass(C)d 154.1 grams    LV mass(C)dI 70.9 grams/m^2    SV(Teich) 72.4 ml    SI(Teich) 33.3 " "ml/m^2    SV(cubed) 87.2 ml    SI(cubed) 40.1 ml/m^2    ACS 2.0 cm    LVLd ap4 7.0 cm    EDV(MOD-sp4) 58.0 ml    LVLs ap4 6.1 cm    ESV(MOD-sp4) 18.0 ml    EF(MOD-sp4) 69.0 %    SV(MOD-sp4) 40.0 ml    SI(MOD-sp4) 18.4 ml/m^2    LV Bernard Vol (BSA corrected) 26.7 ml/m^2    LV Sys Vol (BSA corrected) 8.3 ml/m^2    MV A dur 0.11 sec    MV E max blanco 65.5 cm/sec    MV A max blanco 80.0 cm/sec    MV E/A 0.82     MV P1/2t max blanco 68.9 cm/sec    MV P1/2t 83.1 msec    MVA(P1/2t) 2.6 cm^2    MV dec slope 242.9 cm/sec^2    MV dec time 0.27 sec    Ao pk blanco 146.4 cm/sec    Ao max PG 8.6 mmHg    Pulm Sys Blanco 58.7 cm/sec    Pulm Bernard Blanco 44.1 cm/sec    Pulm S/D 1.3     Pulm A Revs Dur 0.09 sec    Pulm A Revs Blanco 41.7 cm/sec    MVA P1/2T LCG 3.2 cm^2     CV ECHO CLAYTON - BZI_BMI 33.2 kilograms/m^2     CV ECHO [place]TON - BSA(HAYCOCK) 2.3 m^2     CV ECHO C[place]TON - BZI_METRIC_WEIGHT 102.1 kg     CV ECHO CLAYTON - BZI_METRIC_HEIGHT 175.3 cm    Percent Target  %    Avg E/e' ratio 7.28     Percent Max Pred HR 91.93 %    PostStressEF 70 %    Target HR (85%) 137 bpm    Max. Pred. HR (100%) 161 bpm    Estimated workload 7.0 METS         The following portions of the patient's history were reviewed and updated as appropriate: allergies, current medications, past social history and problem list.    Review of Systems   Constitutional: Negative for fever.   Respiratory: Negative for cough and shortness of breath.    Cardiovascular: Negative for chest pain.   Gastrointestinal: Negative for abdominal pain.   Neurological: Negative for dizziness.       Objective   /72 (BP Location: Right arm, Patient Position: Sitting)   Pulse 72   Temp 98.2 °F (36.8 °C)   Ht 175.3 cm (69.02\")   Wt 103 kg (226 lb 6.4 oz)   SpO2 98%   BMI 33.42 kg/m²   Physical Exam   Constitutional: He is oriented to person, place, and time. Vital signs are normal. He appears well-developed and well-nourished. No distress.   HENT:   Head: Normocephalic.   Cardiovascular: " Normal rate, regular rhythm and normal heart sounds.   Pulmonary/Chest: Effort normal and breath sounds normal.   Neurological: He is alert and oriented to person, place, and time. Gait normal.   Psychiatric: He has a normal mood and affect. His behavior is normal. Judgment and thought content normal.   Vitals reviewed.      Assessment/Plan      Diagnosis Plan   1. Type 2 diabetes mellitus without complication, without long-term current use of insulin (CMS/[facility])  POC Glycosylated Hemoglobin (Hb A1C)   2. Essential hypertension     3. Gastroesophageal reflux disease without esophagitis     4. Hyperlipidemia, unspecified hyperlipidemia type  Lipid Panel With LDL / HDL Ratio     Cont same with meds  Follow up after lab  Garret Sexton, APRN  11/26/2019

## 2019-11-27 LAB
CHOLEST SERPL-MCNC: 173 MG/DL (ref 0–200)
HDLC SERPL-MCNC: 39 MG/DL (ref 40–60)
LDLC SERPL CALC-MCNC: 85 MG/DL (ref 0–100)
LDLC/HDLC SERPL: 2.19 {RATIO}
TRIGL SERPL-MCNC: 243 MG/DL (ref 0–150)
VLDLC SERPL CALC-MCNC: 48.6 MG/DL

## 2020-01-13 ENCOUNTER — PRIOR AUTHORIZATION (OUTPATIENT)
Dept: FAMILY MEDICINE CLINIC | Facility: CLINIC | Age: 60
End: 2020-01-13

## 2020-01-28 ENCOUNTER — OFFICE VISIT (OUTPATIENT)
Dept: FAMILY MEDICINE CLINIC | Facility: CLINIC | Age: 60
End: 2020-01-28

## 2020-01-28 VITALS
BODY MASS INDEX: 34.36 KG/M2 | HEIGHT: 69 IN | OXYGEN SATURATION: 98 % | HEART RATE: 61 BPM | RESPIRATION RATE: 18 BRPM | SYSTOLIC BLOOD PRESSURE: 134 MMHG | DIASTOLIC BLOOD PRESSURE: 80 MMHG | TEMPERATURE: 98.7 F | WEIGHT: 232 LBS

## 2020-01-28 DIAGNOSIS — G47.00 INSOMNIA, UNSPECIFIED TYPE: ICD-10-CM

## 2020-01-28 DIAGNOSIS — Z77.21 EXPOSURE TO BLOOD OR BODY FLUID: Primary | ICD-10-CM

## 2020-01-28 PROCEDURE — 99213 OFFICE O/P EST LOW 20 MIN: CPT | Performed by: NURSE PRACTITIONER

## 2020-01-28 RX ORDER — ZOLPIDEM TARTRATE 10 MG/1
10 TABLET ORAL NIGHTLY
Qty: 90 TABLET | Refills: 0 | Status: SHIPPED | OUTPATIENT
Start: 2020-01-28 | End: 2020-05-15 | Stop reason: SDUPTHER

## 2020-01-28 NOTE — PROGRESS NOTES
"Subjective   Ki rDaper is a 59 y.o. male.     History of Present Illness   Ki Draper 59 y.o. male presents for follow up of insomnia with onset of symptoms years ago. Patient describes symptoms as frequent night time awakening and difficulty falling asleep. Patient has found complete relief with Ambien (Zolpidem). Associated symptoms include: fatigue if unable to take Rx . Patient denies daytime somnolence Symptoms have stabilized.  The patient has failed multiple OTC medications for insomnia.  They are well controlled on current Rx and will continue to try to take Rx PRN.  He will use the lowest effective dose.  The patient has read and signed the [facility] Controlled Substance Contract.  I will continue to see patient for regular follow up appointments and be prescribed the lowest effective dose.  LINDSAY has been reviewed by me and is updated every 3 months. The patient is aware of the potential for addiction and dependence.  He denies that Ambien (Zolpidem) causes excessive daytime drowsiness and sleep walking.  Patient voices understanding to take Ambien (Zolpidem) and go straight to bed. Patient must be able to sleep 7 hours or more when taking this.  Patient will hold Rx and contact me if they experience any impaired mental alertness the next day.      Is requesting STD testing bc in a new relationship. No symptoms       The following portions of the patient's history were reviewed and updated as appropriate: allergies, current medications, past social history and problem list.    Review of Systems   Constitutional: Negative for fever.   Respiratory: Negative for cough and shortness of breath.    Cardiovascular: Negative for chest pain.   Gastrointestinal: Negative for abdominal pain.   Neurological: Negative for dizziness.       Objective   /80   Pulse 61   Temp 98.7 °F (37.1 °C) (Oral)   Resp 18   Ht 175.3 cm (69.02\")   Wt 105 kg (232 lb)   SpO2 98%   BMI 34.24 kg/m²   Physical Exam "   Constitutional: He is oriented to person, place, and time. Vital signs are normal. He appears well-developed and well-nourished. No distress.   HENT:   Head: Normocephalic.   Cardiovascular: Normal rate, regular rhythm and normal heart sounds.   Pulmonary/Chest: Effort normal and breath sounds normal.   Neurological: He is alert and oriented to person, place, and time. Gait normal.   Psychiatric: He has a normal mood and affect. His behavior is normal. Judgment and thought content normal.   Vitals reviewed.    The patient has read and signed the [facility] Controlled Substance Contract.  I will continue to see patient for regular follow up appointments.  They are well controlled on their medication.  LINDSAY has been reviewed by me and is updated every 3 months. The patient is aware of the potential for addiction and dependence.    Assessment/Plan      Diagnosis Plan   1. Exposure to blood or body fluid  Hepatitis panel, acute    HSV 1 and 2 IgM, Abs, Indirect    RPR    HSV 1 and 2-Specific Ab, IgG    Chlamydia trachomatis, Neisseria gonorrhoeae, Trichomonas vaginalis, PCR - Urine, Urine, Clean Catch    HIV-1 / O / 2 Ag / Antibody 4th Generation   2. Insomnia, unspecified type  zolpidem (AMBIEN) 10 MG tablet     rto in 3 mons  Follow up after labs   LENNIE Cleaning  1/28/2020

## 2020-01-30 LAB
C TRACH RRNA SPEC QL NAA+PROBE: NEGATIVE
HAV IGM SERPL QL IA: NEGATIVE
HBV CORE IGM SERPL QL IA: NEGATIVE
HBV SURFACE AG SERPL QL IA: NEGATIVE
HCV AB S/CO SERPL IA: 0.2 S/CO RATIO (ref 0–0.9)
HIV 1+2 AB+HIV1 P24 AG SERPL QL IA: NON REACTIVE
HSV1 IGG SER IA-ACNC: 42.9 INDEX (ref 0–0.9)
HSV1 IGM TITR SER IF: NORMAL TITER
HSV2 IGG SER IA-ACNC: <0.91 INDEX (ref 0–0.9)
HSV2 IGM TITR SER IF: NORMAL TITER
N GONORRHOEA RRNA SPEC QL NAA+PROBE: NEGATIVE
RPR SER QL: NON REACTIVE
T VAGINALIS DNA SPEC QL NAA+PROBE: NEGATIVE

## 2020-02-03 ENCOUNTER — TELEPHONE (OUTPATIENT)
Dept: FAMILY MEDICINE CLINIC | Facility: CLINIC | Age: 60
End: 2020-02-03

## 2020-02-19 DIAGNOSIS — E11.9 TYPE 2 DIABETES MELLITUS WITHOUT COMPLICATION, WITHOUT LONG-TERM CURRENT USE OF INSULIN (HCC): ICD-10-CM

## 2020-02-20 RX ORDER — LINAGLIPTIN 5 MG/1
TABLET, FILM COATED ORAL
Qty: 30 TABLET | Refills: 2 | Status: SHIPPED | OUTPATIENT
Start: 2020-02-20 | End: 2020-05-14

## 2020-02-20 RX ORDER — LISINOPRIL 20 MG/1
TABLET ORAL
Qty: 90 TABLET | Refills: 2 | Status: SHIPPED | OUTPATIENT
Start: 2020-02-20 | End: 2020-08-13 | Stop reason: SDUPTHER

## 2020-03-02 DIAGNOSIS — R35.0 BENIGN PROSTATIC HYPERPLASIA WITH URINARY FREQUENCY: ICD-10-CM

## 2020-03-02 DIAGNOSIS — N40.1 BENIGN PROSTATIC HYPERPLASIA WITH URINARY FREQUENCY: ICD-10-CM

## 2020-03-03 RX ORDER — TAMSULOSIN HYDROCHLORIDE 0.4 MG/1
CAPSULE ORAL
Qty: 90 CAPSULE | Refills: 2 | Status: SHIPPED | OUTPATIENT
Start: 2020-03-03 | End: 2021-08-31

## 2020-04-01 DIAGNOSIS — N52.9 IMPOTENCE OF ORGANIC ORIGIN: ICD-10-CM

## 2020-04-01 RX ORDER — SILDENAFIL CITRATE 20 MG/1
TABLET ORAL
Qty: 30 TABLET | Refills: 2 | Status: SHIPPED | OUTPATIENT
Start: 2020-04-01 | End: 2020-08-14

## 2020-05-14 DIAGNOSIS — G47.00 INSOMNIA, UNSPECIFIED TYPE: ICD-10-CM

## 2020-05-14 DIAGNOSIS — E11.9 TYPE 2 DIABETES MELLITUS WITHOUT COMPLICATION, WITHOUT LONG-TERM CURRENT USE OF INSULIN (HCC): ICD-10-CM

## 2020-05-14 RX ORDER — ZOLPIDEM TARTRATE 10 MG/1
TABLET ORAL
Qty: 90 TABLET | Refills: 0 | OUTPATIENT
Start: 2020-05-14

## 2020-05-14 RX ORDER — LINAGLIPTIN 5 MG/1
TABLET, FILM COATED ORAL
Qty: 30 TABLET | Refills: 1 | Status: SHIPPED | OUTPATIENT
Start: 2020-05-14 | End: 2020-07-15

## 2020-05-15 ENCOUNTER — OFFICE VISIT (OUTPATIENT)
Dept: FAMILY MEDICINE CLINIC | Facility: CLINIC | Age: 60
End: 2020-05-15

## 2020-05-15 DIAGNOSIS — G47.00 INSOMNIA, UNSPECIFIED TYPE: ICD-10-CM

## 2020-05-15 PROCEDURE — 99442 PR PHYS/QHP TELEPHONE EVALUATION 11-20 MIN: CPT | Performed by: NURSE PRACTITIONER

## 2020-05-15 RX ORDER — ZOLPIDEM TARTRATE 10 MG/1
10 TABLET ORAL NIGHTLY
Qty: 90 TABLET | Refills: 0 | Status: SHIPPED | OUTPATIENT
Start: 2020-05-15 | End: 2020-08-13 | Stop reason: SDUPTHER

## 2020-05-15 NOTE — PROGRESS NOTES
You have chosen to receive care through a telephone visit. Do you consent to use a telephone visit for your medical care today? Yes    Ki Draper 59 y.o. male presents for follow up of insomnia with onset of symptoms years ago. Patient describes symptoms as early morning awakening and frequent night time awakening. Patient has found complete relief with Ambien (Zolpidem). Associated symptoms include: fatigue if unable to take Rx . Patient denies daytime somnolence Symptoms have stabilized.  The patient has failed multiple OTC medications for insomnia.  They are well controlled on current Rx and will continue to try to take Rx PRN.  He will use the lowest effective dose.  The patient has read and signed the [facility] Controlled Substance Contract.  I will continue to see patient for regular follow up appointments and be prescribed the lowest effective dose.  LINDSAY has been reviewed by me and is updated every 3 months. The patient is aware of the potential for addiction and dependence.  He denies that Ambien (Zolpidem) causes excessive daytime drowsiness and sleep walking.  Patient voices understanding to take Ambien (Zolpidem) and go straight to bed. Patient must be able to sleep 7 hours or more when taking this.  Patient will hold Rx and contact me if they experience any impaired mental alertness the next day.      Visit lasted 12 mins

## 2020-07-14 DIAGNOSIS — E11.9 TYPE 2 DIABETES MELLITUS WITHOUT COMPLICATION, WITHOUT LONG-TERM CURRENT USE OF INSULIN (HCC): ICD-10-CM

## 2020-07-15 RX ORDER — LINAGLIPTIN 5 MG/1
TABLET, FILM COATED ORAL
Qty: 30 TABLET | Refills: 0 | Status: SHIPPED | OUTPATIENT
Start: 2020-07-15 | End: 2020-08-13

## 2020-08-13 ENCOUNTER — OFFICE VISIT (OUTPATIENT)
Dept: FAMILY MEDICINE CLINIC | Facility: CLINIC | Age: 60
End: 2020-08-13

## 2020-08-13 VITALS
WEIGHT: 227.8 LBS | SYSTOLIC BLOOD PRESSURE: 100 MMHG | BODY MASS INDEX: 33.74 KG/M2 | DIASTOLIC BLOOD PRESSURE: 62 MMHG | OXYGEN SATURATION: 98 % | HEIGHT: 69 IN | HEART RATE: 79 BPM | TEMPERATURE: 97.3 F

## 2020-08-13 DIAGNOSIS — I10 ESSENTIAL HYPERTENSION: ICD-10-CM

## 2020-08-13 DIAGNOSIS — E78.5 HYPERLIPIDEMIA, UNSPECIFIED HYPERLIPIDEMIA TYPE: ICD-10-CM

## 2020-08-13 DIAGNOSIS — K21.9 GASTROESOPHAGEAL REFLUX DISEASE WITHOUT ESOPHAGITIS: ICD-10-CM

## 2020-08-13 DIAGNOSIS — E11.9 TYPE 2 DIABETES MELLITUS WITHOUT COMPLICATION, WITHOUT LONG-TERM CURRENT USE OF INSULIN (HCC): Primary | ICD-10-CM

## 2020-08-13 DIAGNOSIS — G47.00 INSOMNIA, UNSPECIFIED TYPE: ICD-10-CM

## 2020-08-13 LAB — HBA1C MFR BLD: 7.5 %

## 2020-08-13 PROCEDURE — 99214 OFFICE O/P EST MOD 30 MIN: CPT | Performed by: NURSE PRACTITIONER

## 2020-08-13 PROCEDURE — 83036 HEMOGLOBIN GLYCOSYLATED A1C: CPT | Performed by: NURSE PRACTITIONER

## 2020-08-13 RX ORDER — ZOLPIDEM TARTRATE 10 MG/1
10 TABLET ORAL NIGHTLY
Qty: 90 TABLET | Refills: 0 | Status: SHIPPED | OUTPATIENT
Start: 2020-08-13 | End: 2020-11-17 | Stop reason: SDUPTHER

## 2020-08-13 RX ORDER — LISINOPRIL 20 MG/1
20 TABLET ORAL DAILY
Qty: 90 TABLET | Refills: 3 | Status: SHIPPED | OUTPATIENT
Start: 2020-08-13 | End: 2021-11-18

## 2020-08-13 RX ORDER — SIMVASTATIN 20 MG
20 TABLET ORAL NIGHTLY
Qty: 90 TABLET | Refills: 3 | Status: SHIPPED | OUTPATIENT
Start: 2020-08-13 | End: 2021-07-20

## 2020-08-13 NOTE — PROGRESS NOTES
"Subjective   Ki Draper is a 60 y.o. male.     History of Present Illness    Since the last visit, he has overall felt well.  He has Essential Hypertension and well controlled on current medication, DMII well controlled on medication and will continue regimen, GERD controlled on PPI Rx, Hyperlipidemia with goals met with current Rx and insomnia well controlled.  he has been compliant with current medications have reviewed them.  The patient denies medication side effects.  Will refill medications. /62   Pulse 79   Temp 97.3 °F (36.3 °C)   Ht 175.3 cm (69\")   Wt 103 kg (227 lb 12.8 oz)   SpO2 98%   BMI 33.64 kg/m²     Results for orders placed or performed in visit on 01/28/20   Chlamydia trachomatis, Neisseria gonorrhoeae, Trichomonas vaginalis, PCR - Urine, Urine, Clean Catch   Result Value Ref Range    Chlamydia trachomatis, ROBINA Negative Negative    Gonococcus by ROBINA Negative Negative    Trichomonas vaginosis Negative Negative   Hepatitis panel, acute   Result Value Ref Range    Hep A IgM Negative Negative    Hepatitis B Surface Ag Negative Negative    Hep B Core IgM Negative Negative    Hep C Virus Ab 0.2 0.0 - 0.9 s/co ratio   HSV 1 and 2 IgM, Abs, Indirect   Result Value Ref Range    HSV 1 IgM <1:10 <1:10 titer    HSV 2 IgM <1:10 <1:10 titer   RPR   Result Value Ref Range    RPR Non Reactive Non Reactive   HSV 1 and 2-Specific Ab, IgG   Result Value Ref Range    HSV 1 IgG, Type Specific 42.90 (H) 0.00 - 0.90 index    HSV 2 IgG <0.91 0.00 - 0.90 index   HIV-1 / O / 2 Ag / Antibody 4th Generation   Result Value Ref Range    HIV Screen 4th Gen w/RFX (Reference) Non Reactive Non Reactive         The following portions of the patient's history were reviewed and updated as appropriate: allergies, current medications, past social history and problem list.    Review of Systems   Constitutional: Negative for fever.   Respiratory: Negative for cough and shortness of breath.    Cardiovascular: Negative for " "chest pain.   Gastrointestinal: Negative for abdominal pain.   Neurological: Negative for dizziness.       Objective   /62   Pulse 79   Temp 97.3 °F (36.3 °C)   Ht 175.3 cm (69\")   Wt 103 kg (227 lb 12.8 oz)   SpO2 98%   BMI 33.64 kg/m²   Physical Exam   Constitutional: He is oriented to person, place, and time. Vital signs are normal. He appears well-developed and well-nourished. No distress.   HENT:   Head: Normocephalic.   Cardiovascular: Normal rate, regular rhythm and normal heart sounds.   Pulmonary/Chest: Effort normal and breath sounds normal.   Neurological: He is alert and oriented to person, place, and time. Gait normal.   Psychiatric: He has a normal mood and affect. His behavior is normal. Judgment and thought content normal.   Vitals reviewed.      Assessment/Plan      Diagnosis Plan   1. Type 2 diabetes mellitus without complication, without long-term current use of insulin (CMS/[facility])  POC Glycosylated Hemoglobin (Hb A1C)    metFORMIN (GLUCOPHAGE) 1000 MG tablet    SITagliptin (Januvia) 100 MG tablet    MicroAlbumin, Urine, Random - Urine, Clean Catch   2. Insomnia, unspecified type  zolpidem (AMBIEN) 10 MG tablet   3. Essential hypertension  lisinopril (PRINIVIL,ZESTRIL) 20 MG tablet    MicroAlbumin, Urine, Random - Urine, Clean Catch   4. Gastroesophageal reflux disease without esophagitis     5. Hyperlipidemia, unspecified hyperlipidemia type  simvastatin (Zocor) 20 MG tablet   cont same with meds  rto in 3 mons   Watch diet   Garret Sexton, APRN  8/13/2020  "

## 2020-08-14 DIAGNOSIS — N52.9 IMPOTENCE OF ORGANIC ORIGIN: ICD-10-CM

## 2020-08-14 LAB — MICROALBUMIN UR-MCNC: <3 UG/ML

## 2020-08-14 RX ORDER — SILDENAFIL CITRATE 20 MG/1
TABLET ORAL
Qty: 30 TABLET | Refills: 1 | Status: SHIPPED | OUTPATIENT
Start: 2020-08-14 | End: 2020-11-30

## 2020-11-09 DIAGNOSIS — E78.5 HYPERLIPIDEMIA, UNSPECIFIED HYPERLIPIDEMIA TYPE: ICD-10-CM

## 2020-11-09 DIAGNOSIS — G47.00 INSOMNIA, UNSPECIFIED TYPE: ICD-10-CM

## 2020-11-09 RX ORDER — ZOLPIDEM TARTRATE 10 MG/1
10 TABLET ORAL NIGHTLY
Qty: 90 TABLET | Refills: 0 | OUTPATIENT
Start: 2020-11-09

## 2020-11-09 RX ORDER — EMPAGLIFLOZIN 10 MG/1
TABLET, FILM COATED ORAL
Qty: 30 TABLET | Refills: 10 | Status: SHIPPED | OUTPATIENT
Start: 2020-11-09 | End: 2020-11-17

## 2020-11-09 RX ORDER — SIMVASTATIN 20 MG
20 TABLET ORAL NIGHTLY
Qty: 90 TABLET | Refills: 3 | OUTPATIENT
Start: 2020-11-09

## 2020-11-09 NOTE — TELEPHONE ENCOUNTER
Caller: Ki Draper    Relationship: Self    Best call back number: [phone]    Medication needed:   Requested Prescriptions     Pending Prescriptions Disp Refills   • zolpidem (AMBIEN) 10 MG tablet 90 tablet 0     Sig: Take 1 tablet by mouth Every Night.   • simvastatin (Zocor) 20 MG tablet 90 tablet 3     Sig: Take 1 tablet by mouth Every Night.   - JARDIANCE 10 MG tablet    When do you need the refill by: ASAP    What details did the patient provide when requesting the medication: Out of Jardiance.    Does the patient have less than a 3 day supply:  [x] Yes  [] No    [facility] [address] AT [facility] [phone] [facility] [phone]

## 2020-11-17 ENCOUNTER — OFFICE VISIT (OUTPATIENT)
Dept: FAMILY MEDICINE CLINIC | Facility: CLINIC | Age: 60
End: 2020-11-17

## 2020-11-17 VITALS
BODY MASS INDEX: 33.92 KG/M2 | SYSTOLIC BLOOD PRESSURE: 122 MMHG | WEIGHT: 229 LBS | TEMPERATURE: 97.1 F | OXYGEN SATURATION: 99 % | HEART RATE: 73 BPM | DIASTOLIC BLOOD PRESSURE: 70 MMHG | HEIGHT: 69 IN

## 2020-11-17 DIAGNOSIS — K21.9 GASTROESOPHAGEAL REFLUX DISEASE WITHOUT ESOPHAGITIS: ICD-10-CM

## 2020-11-17 DIAGNOSIS — Z12.5 SPECIAL SCREENING FOR MALIGNANT NEOPLASM OF PROSTATE: ICD-10-CM

## 2020-11-17 DIAGNOSIS — I10 ESSENTIAL HYPERTENSION: ICD-10-CM

## 2020-11-17 DIAGNOSIS — E78.5 HYPERLIPIDEMIA, UNSPECIFIED HYPERLIPIDEMIA TYPE: ICD-10-CM

## 2020-11-17 DIAGNOSIS — G47.00 INSOMNIA, UNSPECIFIED TYPE: ICD-10-CM

## 2020-11-17 DIAGNOSIS — E11.9 TYPE 2 DIABETES MELLITUS WITHOUT COMPLICATION, WITHOUT LONG-TERM CURRENT USE OF INSULIN (HCC): Primary | ICD-10-CM

## 2020-11-17 LAB — HBA1C MFR BLD: 7.6 %

## 2020-11-17 PROCEDURE — 99214 OFFICE O/P EST MOD 30 MIN: CPT | Performed by: NURSE PRACTITIONER

## 2020-11-17 PROCEDURE — 83036 HEMOGLOBIN GLYCOSYLATED A1C: CPT | Performed by: NURSE PRACTITIONER

## 2020-11-17 RX ORDER — ZOLPIDEM TARTRATE 10 MG/1
10 TABLET ORAL NIGHTLY
Qty: 90 TABLET | Refills: 0 | Status: SHIPPED | OUTPATIENT
Start: 2020-11-17 | End: 2021-02-15 | Stop reason: SDUPTHER

## 2020-11-17 RX ORDER — EMPAGLIFLOZIN 25 MG/1
25 TABLET, FILM COATED ORAL DAILY
Qty: 90 TABLET | Refills: 3 | Status: SHIPPED | OUTPATIENT
Start: 2020-11-17 | End: 2021-11-18

## 2020-11-17 NOTE — PROGRESS NOTES
"Subjective   Ki Draper is a 60 y.o. male.   Chief Complaint   Patient presents with   • Diabetes     Patient is needing his a1c checked ( wore mask and goggles)    • Insomnia     Patient is needing his ambien refilled he needs drug screen        History of Present Illness    Since the last visit, he has overall felt well.  He has Essential Hypertension and well controlled on current medication, DMII not controlled on current regimen and will add/adjust medication, work on diet and exercise, get follow up labs, GERD controlled on PPI Rx and insomnia controlled, lipids getting rechecked today .  he has been compliant with current medications have reviewed them.  The patient denies medication side effects.  Will refill medications. /70   Pulse 73   Temp 97.1 °F (36.2 °C)   Ht 175.3 cm (69.02\")   Wt 104 kg (229 lb)   SpO2 99%   BMI 33.80 kg/m²     Results for orders placed or performed in visit on 11/17/20   POC Glycosylated Hemoglobin (Hb A1C)    Specimen: Blood   Result Value Ref Range    Hemoglobin A1C 7.6 %         The following portions of the patient's history were reviewed and updated as appropriate: allergies, current medications, past social history and problem list.    Review of Systems   Constitutional: Negative for fever.   Respiratory: Negative for cough and shortness of breath.    Cardiovascular: Negative for chest pain.   Gastrointestinal: Negative for abdominal pain.   Neurological: Negative for dizziness.       Objective   /70   Pulse 73   Temp 97.1 °F (36.2 °C)   Ht 175.3 cm (69.02\")   Wt 104 kg (229 lb)   SpO2 99%   BMI 33.80 kg/m²   Physical Exam  Vitals signs reviewed.   Constitutional:       General: He is not in acute distress.     Appearance: He is well-developed.   HENT:      Head: Normocephalic.   Cardiovascular:      Rate and Rhythm: Normal rate and regular rhythm.      Heart sounds: Normal heart sounds.   Pulmonary:      Effort: Pulmonary effort is normal.      " Breath sounds: Normal breath sounds.   Neurological:      Mental Status: He is alert and oriented to person, place, and time.      Gait: Gait normal.   Psychiatric:         Behavior: Behavior normal.         Thought Content: Thought content normal.         Judgment: Judgment normal.         Assessment/Plan      Diagnosis Plan   1. Type 2 diabetes mellitus without complication, without long-term current use of insulin (CMS/[facility])  POC Glycosylated Hemoglobin (Hb A1C)    Comprehensive Metabolic Panel    Empagliflozin (Jardiance) 25 MG tablet   2. Insomnia, unspecified type  zolpidem (AMBIEN) 10 MG tablet   3. Essential hypertension     4. Gastroesophageal reflux disease without esophagitis     5. Hyperlipidemia, unspecified hyperlipidemia type  Comprehensive Metabolic Panel    Lipid Panel With LDL / HDL Ratio   6. Special screening for malignant neoplasm of prostate  PSA Screen     Increase jardiance to 25mg daily  rto in 3 mons   Follow up after labs   Cont same with other meds    Mask and googles worn    LENNIE Cleaning  11/17/2020

## 2020-11-18 LAB
ALBUMIN SERPL-MCNC: 4.5 G/DL (ref 3.5–5.2)
ALBUMIN/GLOB SERPL: 1.9 G/DL
ALP SERPL-CCNC: 68 U/L (ref 39–117)
ALT SERPL-CCNC: 13 U/L (ref 1–41)
AST SERPL-CCNC: 11 U/L (ref 1–40)
BILIRUB SERPL-MCNC: 0.7 MG/DL (ref 0–1.2)
BUN SERPL-MCNC: 16 MG/DL (ref 8–23)
BUN/CREAT SERPL: 15.4 (ref 7–25)
CALCIUM SERPL-MCNC: 9.6 MG/DL (ref 8.6–10.5)
CHLORIDE SERPL-SCNC: 102 MMOL/L (ref 98–107)
CHOLEST SERPL-MCNC: 167 MG/DL (ref 0–200)
CO2 SERPL-SCNC: 27.6 MMOL/L (ref 22–29)
CREAT SERPL-MCNC: 1.04 MG/DL (ref 0.76–1.27)
GLOBULIN SER CALC-MCNC: 2.4 GM/DL
GLUCOSE SERPL-MCNC: 144 MG/DL (ref 65–99)
HDLC SERPL-MCNC: 43 MG/DL (ref 40–60)
LDLC SERPL CALC-MCNC: 87 MG/DL (ref 0–100)
LDLC/HDLC SERPL: 1.87 {RATIO}
POTASSIUM SERPL-SCNC: 4.8 MMOL/L (ref 3.5–5.2)
PROT SERPL-MCNC: 6.9 G/DL (ref 6–8.5)
PSA SERPL-MCNC: 2.5 NG/ML (ref 0–4)
SODIUM SERPL-SCNC: 142 MMOL/L (ref 136–145)
TRIGL SERPL-MCNC: 217 MG/DL (ref 0–150)
VLDLC SERPL CALC-MCNC: 37 MG/DL (ref 5–40)

## 2020-11-26 DIAGNOSIS — N52.9 IMPOTENCE OF ORGANIC ORIGIN: ICD-10-CM

## 2020-11-30 RX ORDER — SILDENAFIL CITRATE 20 MG/1
TABLET ORAL
Qty: 30 TABLET | Refills: 0 | Status: SHIPPED | OUTPATIENT
Start: 2020-11-30 | End: 2021-01-29

## 2021-01-28 DIAGNOSIS — N52.9 IMPOTENCE OF ORGANIC ORIGIN: ICD-10-CM

## 2021-01-29 RX ORDER — SILDENAFIL CITRATE 20 MG/1
TABLET ORAL
Qty: 30 TABLET | Refills: 0 | Status: SHIPPED | OUTPATIENT
Start: 2021-01-29 | End: 2021-03-05 | Stop reason: SDUPTHER

## 2021-02-15 DIAGNOSIS — G47.00 INSOMNIA, UNSPECIFIED TYPE: ICD-10-CM

## 2021-02-15 RX ORDER — ZOLPIDEM TARTRATE 10 MG/1
10 TABLET ORAL NIGHTLY
Qty: 30 TABLET | Refills: 0 | Status: SHIPPED | OUTPATIENT
Start: 2021-02-15 | End: 2021-03-05 | Stop reason: SDUPTHER

## 2021-02-15 NOTE — TELEPHONE ENCOUNTER
Caller: Ki Draper    Relationship: Self    Best call back number: [phone]*    Medication needed:   Requested Prescriptions     Pending Prescriptions Disp Refills   • zolpidem (AMBIEN) 10 MG tablet 90 tablet 0     Sig: Take 1 tablet by mouth Every Night.       When do you need the refill by: ASAP    What details did the patient provide when requesting the medication: PATIENT HAS 2 DAYS OF MEDICATION LEFT    Does the patient have less than a 3 day supply:  [x] Yes  [] No    What is the patient's preferred pharmacy: [facility] [place] [address] RD AT [facility] [phone] [facility] [phone]

## 2021-03-05 ENCOUNTER — OFFICE VISIT (OUTPATIENT)
Dept: FAMILY MEDICINE CLINIC | Facility: CLINIC | Age: 61
End: 2021-03-05

## 2021-03-05 VITALS
DIASTOLIC BLOOD PRESSURE: 70 MMHG | HEART RATE: 79 BPM | SYSTOLIC BLOOD PRESSURE: 112 MMHG | TEMPERATURE: 97.8 F | BODY MASS INDEX: 33.44 KG/M2 | WEIGHT: 225.8 LBS | HEIGHT: 69 IN | OXYGEN SATURATION: 97 %

## 2021-03-05 DIAGNOSIS — K21.9 GASTROESOPHAGEAL REFLUX DISEASE WITHOUT ESOPHAGITIS: Chronic | ICD-10-CM

## 2021-03-05 DIAGNOSIS — R97.20 ELEVATED PSA: ICD-10-CM

## 2021-03-05 DIAGNOSIS — I10 ESSENTIAL HYPERTENSION: Chronic | ICD-10-CM

## 2021-03-05 DIAGNOSIS — N52.9 IMPOTENCE OF ORGANIC ORIGIN: ICD-10-CM

## 2021-03-05 DIAGNOSIS — G47.00 INSOMNIA, UNSPECIFIED TYPE: Chronic | ICD-10-CM

## 2021-03-05 DIAGNOSIS — E11.9 TYPE 2 DIABETES MELLITUS WITHOUT COMPLICATION, WITHOUT LONG-TERM CURRENT USE OF INSULIN (HCC): Primary | Chronic | ICD-10-CM

## 2021-03-05 DIAGNOSIS — E78.5 HYPERLIPIDEMIA, UNSPECIFIED HYPERLIPIDEMIA TYPE: Chronic | ICD-10-CM

## 2021-03-05 PROBLEM — K58.0 IRRITABLE BOWEL SYNDROME WITH DIARRHEA: Chronic | Status: ACTIVE | Noted: 2017-01-12

## 2021-03-05 PROBLEM — N40.1 BENIGN PROSTATIC HYPERPLASIA WITH URINARY FREQUENCY: Chronic | Status: ACTIVE | Noted: 2018-03-08

## 2021-03-05 PROBLEM — Z77.21 EXPOSURE TO BLOOD OR BODY FLUID: Status: RESOLVED | Noted: 2020-01-28 | Resolved: 2021-03-05

## 2021-03-05 PROBLEM — R07.9 CHEST PAIN: Status: RESOLVED | Noted: 2019-08-26 | Resolved: 2021-03-05

## 2021-03-05 PROBLEM — Z12.11 COLON CANCER SCREENING: Status: RESOLVED | Noted: 2019-02-26 | Resolved: 2021-03-05

## 2021-03-05 PROBLEM — B35.9 RINGWORM: Status: RESOLVED | Noted: 2019-08-26 | Resolved: 2021-03-05

## 2021-03-05 PROBLEM — G47.33 OBSTRUCTIVE SLEEP APNEA SYNDROME: Chronic | Status: ACTIVE | Noted: 2017-01-12

## 2021-03-05 PROBLEM — M16.12 PRIMARY OSTEOARTHRITIS OF LEFT HIP: Chronic | Status: ACTIVE | Noted: 2018-01-11

## 2021-03-05 PROBLEM — R35.0 BENIGN PROSTATIC HYPERPLASIA WITH URINARY FREQUENCY: Chronic | Status: ACTIVE | Noted: 2018-03-08

## 2021-03-05 PROBLEM — Z12.5 SPECIAL SCREENING FOR MALIGNANT NEOPLASM OF PROSTATE: Status: RESOLVED | Noted: 2018-03-08 | Resolved: 2021-03-05

## 2021-03-05 LAB
HBA1C MFR BLD: 7.5 %
PSA SERPL-MCNC: 2.59 NG/ML (ref 0–4)

## 2021-03-05 PROCEDURE — 83036 HEMOGLOBIN GLYCOSYLATED A1C: CPT | Performed by: NURSE PRACTITIONER

## 2021-03-05 PROCEDURE — 99214 OFFICE O/P EST MOD 30 MIN: CPT | Performed by: NURSE PRACTITIONER

## 2021-03-05 RX ORDER — SILDENAFIL CITRATE 20 MG/1
TABLET ORAL
Qty: 30 TABLET | Refills: 0 | Status: SHIPPED | OUTPATIENT
Start: 2021-03-05 | End: 2021-06-07

## 2021-03-05 RX ORDER — ZOLPIDEM TARTRATE 10 MG/1
10 TABLET ORAL NIGHTLY
Qty: 90 TABLET | Refills: 0 | Status: SHIPPED | OUTPATIENT
Start: 2021-03-05 | End: 2021-06-11

## 2021-03-05 NOTE — PROGRESS NOTES
"Chief Complaint  Diabetes (Patient is needing his a1c checked ( wore mask and goggles) ), Elevated PSA (Patient is needing to recheck his psa level ), and Insomnia (Patient needed to be seen to continue to get his ambien refilled he needs drug screen )    Subjective          Ki Draper presents to [facility]  History of Present Illness  1. DM- Last visit was 11/2020 and a1c was 7.6.  We increase Jardiance to 25mg daily.  He is taking Metformin and the jardiance daily as directed without side effects.  Today a1c is 7.5    2. HTN- BP is well controlled at home and today in office.  He is currently on lisinopril 20 mg daily as directed without any side effects.    3.hyperlipidemia-patient is currently taking simvastatin 20 mg daily as directed any side effects cholesterol was last checked in November 2020 and it was well controlled    4.. GERD-patient is currently on Prevacid 15 mg daily as directed without any side effects and acid reflux is well controlled    5.. Insomnia-patient currently taking 10 mg of Ambien nightly as directed without any side effects and working well to control his insomnia    6. Elevated PSA-as a level was last checked in November 2020 with a level of 2.5 which was an increase from the last time that we checked it 2 years ago at 1.99.  rechecking that level today to make sure that it has not increased anymore.    7. ED-patient using tadalafil 20 mg on a as needed basis for erectile dysfunction.  Patient states it is working well requesting a refill     Objective   Vital Signs:   /70   Pulse 79   Temp 97.8 °F (36.6 °C)   Ht 175.3 cm (69.02\")   Wt 102 kg (225 lb 12.8 oz)   SpO2 97%   BMI 33.33 kg/m²     Physical Exam  Vitals signs reviewed.   Constitutional:       General: He is not in acute distress.     Appearance: He is well-developed.   HENT:      Head: Normocephalic.   Cardiovascular:      Rate and Rhythm: Normal rate and regular rhythm.      " Heart sounds: Normal heart sounds.   Pulmonary:      Effort: Pulmonary effort is normal.      Breath sounds: Normal breath sounds.   Neurological:      Mental Status: He is alert and oriented to person, place, and time.      Gait: Gait normal.   Psychiatric:         Behavior: Behavior normal.         Thought Content: Thought content normal.         Judgment: Judgment normal.        Result Review :   The following data was reviewed by: LENNIE Cleaning on 03/05/2021:  CMP    CMP 11/17/20   Glucose 144 (A)   BUN 16   Creatinine 1.04   eGFR Non  Am 73   eGFR  Am 88   Sodium 142   Potassium 4.8   Chloride 102   Calcium 9.6   Total Protein 6.9   Albumin 4.50   Globulin 2.4   Total Bilirubin 0.7   Alkaline Phosphatase 68   AST (SGOT) 11   ALT (SGPT) 13   (A) Abnormal value              Lipid Panel    Lipid Panel 11/17/20   Total Cholesterol 167   Triglycerides 217 (A)   HDL Cholesterol 43   VLDL Cholesterol 37   LDL Cholesterol  87   LDL/HDL Ratio 1.87   (A) Abnormal value            Most Recent A1C    HGBA1C Most Recent 11/17/20   Hemoglobin A1C 7.6           PSA    PSA 11/17/20   PSA 2.500      Comments are available for some flowsheets but are not being displayed.                     Assessment and Plan    Diagnoses and all orders for this visit:    1. Type 2 diabetes mellitus without complication, without long-term current use of insulin (CMS/[facility]) (Primary)  -     POC Glycosylated Hemoglobin (Hb A1C)    2. Essential hypertension    3. Gastroesophageal reflux disease without esophagitis    4. Hyperlipidemia, unspecified hyperlipidemia type    5. Insomnia, unspecified type  -     zolpidem (AMBIEN) 10 MG tablet; Take 1 tablet by mouth Every Night.  Dispense: 90 tablet; Refill: 0    6. Elevated PSA  -     PSA DIAGNOSTIC    7. Impotence of organic origin  -     sildenafil (REVATIO) 20 MG tablet; 1 daily prn  Dispense: 30 tablet; Refill: 0        Follow Up   No follow-ups on file.  Patient was given  instructions and counseling regarding his condition or for health maintenance advice. Please see specific information pulled into the AVS if appropriate.     No changes in medications.  rto in 3 mons work on diet, exercise and weight loss to help with a1c.  Follow up after lab    Mask and googles worn

## 2021-06-07 DIAGNOSIS — N52.9 IMPOTENCE OF ORGANIC ORIGIN: ICD-10-CM

## 2021-06-08 RX ORDER — SILDENAFIL CITRATE 20 MG/1
TABLET ORAL
Qty: 30 TABLET | Refills: 0 | Status: SHIPPED | OUTPATIENT
Start: 2021-06-08 | End: 2021-08-12

## 2021-06-11 ENCOUNTER — OFFICE VISIT (OUTPATIENT)
Dept: FAMILY MEDICINE CLINIC | Facility: CLINIC | Age: 61
End: 2021-06-11

## 2021-06-11 VITALS
OXYGEN SATURATION: 98 % | BODY MASS INDEX: 34.13 KG/M2 | HEIGHT: 69 IN | HEART RATE: 74 BPM | SYSTOLIC BLOOD PRESSURE: 118 MMHG | DIASTOLIC BLOOD PRESSURE: 64 MMHG | TEMPERATURE: 97.1 F | WEIGHT: 230.4 LBS

## 2021-06-11 DIAGNOSIS — E11.9 TYPE 2 DIABETES MELLITUS WITHOUT COMPLICATION, WITHOUT LONG-TERM CURRENT USE OF INSULIN (HCC): Primary | Chronic | ICD-10-CM

## 2021-06-11 DIAGNOSIS — E78.5 HYPERLIPIDEMIA, UNSPECIFIED HYPERLIPIDEMIA TYPE: Chronic | ICD-10-CM

## 2021-06-11 DIAGNOSIS — I10 ESSENTIAL HYPERTENSION: Chronic | ICD-10-CM

## 2021-06-11 DIAGNOSIS — G47.00 INSOMNIA, UNSPECIFIED TYPE: Chronic | ICD-10-CM

## 2021-06-11 DIAGNOSIS — Z79.899 HIGH RISK MEDICATION USE: ICD-10-CM

## 2021-06-11 DIAGNOSIS — Z13.0 SCREENING FOR IRON DEFICIENCY ANEMIA: ICD-10-CM

## 2021-06-11 DIAGNOSIS — K21.9 GASTROESOPHAGEAL REFLUX DISEASE WITHOUT ESOPHAGITIS: Chronic | ICD-10-CM

## 2021-06-11 LAB
ALBUMIN SERPL-MCNC: 4.6 G/DL (ref 3.5–5.2)
ALBUMIN/GLOB SERPL: 2 G/DL
ALP SERPL-CCNC: 64 U/L (ref 39–117)
ALT SERPL-CCNC: 13 U/L (ref 1–41)
AST SERPL-CCNC: 12 U/L (ref 1–40)
BASOPHILS # BLD AUTO: 0.09 10*3/MM3 (ref 0–0.2)
BASOPHILS NFR BLD AUTO: 1.4 % (ref 0–1.5)
BILIRUB SERPL-MCNC: 0.9 MG/DL (ref 0–1.2)
BUN SERPL-MCNC: 15 MG/DL (ref 8–23)
BUN/CREAT SERPL: 13.4 (ref 7–25)
CALCIUM SERPL-MCNC: 9.5 MG/DL (ref 8.6–10.5)
CHLORIDE SERPL-SCNC: 102 MMOL/L (ref 98–107)
CHOLEST SERPL-MCNC: 192 MG/DL (ref 0–200)
CO2 SERPL-SCNC: 24.9 MMOL/L (ref 22–29)
CREAT SERPL-MCNC: 1.12 MG/DL (ref 0.76–1.27)
EOSINOPHIL # BLD AUTO: 0.22 10*3/MM3 (ref 0–0.4)
EOSINOPHIL NFR BLD AUTO: 3.3 % (ref 0.3–6.2)
ERYTHROCYTE [DISTWIDTH] IN BLOOD BY AUTOMATED COUNT: 13.3 % (ref 12.3–15.4)
GLOBULIN SER CALC-MCNC: 2.3 GM/DL
GLUCOSE SERPL-MCNC: 169 MG/DL (ref 65–99)
HBA1C MFR BLD: 7.6 %
HCT VFR BLD AUTO: 50 % (ref 37.5–51)
HDLC SERPL-MCNC: 38 MG/DL (ref 40–60)
HGB BLD-MCNC: 16.6 G/DL (ref 13–17.7)
IMM GRANULOCYTES # BLD AUTO: 0.04 10*3/MM3 (ref 0–0.05)
IMM GRANULOCYTES NFR BLD AUTO: 0.6 % (ref 0–0.5)
LDLC SERPL CALC-MCNC: 99 MG/DL (ref 0–100)
LDLC/HDLC SERPL: 2.35 {RATIO}
LYMPHOCYTES # BLD AUTO: 1.71 10*3/MM3 (ref 0.7–3.1)
LYMPHOCYTES NFR BLD AUTO: 25.9 % (ref 19.6–45.3)
MCH RBC QN AUTO: 30.7 PG (ref 26.6–33)
MCHC RBC AUTO-ENTMCNC: 33.2 G/DL (ref 31.5–35.7)
MCV RBC AUTO: 92.4 FL (ref 79–97)
MONOCYTES # BLD AUTO: 0.55 10*3/MM3 (ref 0.1–0.9)
MONOCYTES NFR BLD AUTO: 8.3 % (ref 5–12)
NEUTROPHILS # BLD AUTO: 3.99 10*3/MM3 (ref 1.7–7)
NEUTROPHILS NFR BLD AUTO: 60.5 % (ref 42.7–76)
NRBC BLD AUTO-RTO: 0 /100 WBC (ref 0–0.2)
PLATELET # BLD AUTO: 282 10*3/MM3 (ref 140–450)
POTASSIUM SERPL-SCNC: 4.6 MMOL/L (ref 3.5–5.2)
PROT SERPL-MCNC: 6.9 G/DL (ref 6–8.5)
RBC # BLD AUTO: 5.41 10*6/MM3 (ref 4.14–5.8)
SODIUM SERPL-SCNC: 141 MMOL/L (ref 136–145)
TRIGL SERPL-MCNC: 324 MG/DL (ref 0–150)
VLDLC SERPL CALC-MCNC: 55 MG/DL (ref 5–40)
WBC # BLD AUTO: 6.6 10*3/MM3 (ref 3.4–10.8)

## 2021-06-11 PROCEDURE — 83036 HEMOGLOBIN GLYCOSYLATED A1C: CPT | Performed by: NURSE PRACTITIONER

## 2021-06-11 PROCEDURE — 99214 OFFICE O/P EST MOD 30 MIN: CPT | Performed by: NURSE PRACTITIONER

## 2021-06-11 RX ORDER — ZOLPIDEM TARTRATE 12.5 MG/1
12.5 TABLET, FILM COATED, EXTENDED RELEASE ORAL NIGHTLY PRN
Qty: 90 TABLET | Refills: 0 | Status: SHIPPED | OUTPATIENT
Start: 2021-06-11 | End: 2021-09-14

## 2021-06-11 NOTE — PROGRESS NOTES
"Chief Complaint  Diabetes (Patient is needing his a1c checked ( wore mask and goggles) ) and Insomnia (Patient is needing his ambien refilled needs drug screen )    Subjective          Ki Draper presents to [facility]  History of Present Illness  #1 diabetes-patient is currently on metformin 1000 mg twice daily, Januvia 100 mg daily and Jardiance 25 mg daily as directed without any side effects.  His A1c was last checked in March 2021 with a result of 7.5.    2.  Hyperlipidemia-patient is currently on simvastatin 20 mg daily as directed without any side effects.  His cholesterol was last checked in November 2020 with a total cholesterol of 167 and an LDL of 87.    3.  GERD-patient is currently on Prevacid 15 mg daily as directed without any side effects.  Working well to control his symptoms of GERD.    4.  Insomnia-patient is currently on Ambien 10 mg nightly as directed without any side effects working well to control his insomnia.would like to retry Ambien CR bc it worked better and only changed bc of insurance cost    5.  Hypertension-patient is currently on lisinopril 20 mg daily as directed without any side effects.  His blood pressures well controlled today in office.    Objective   Vital Signs:   /64   Pulse 74   Temp 97.1 °F (36.2 °C)   Ht 175.3 cm (69.02\")   Wt 105 kg (230 lb 6.4 oz)   SpO2 98%   BMI 34.01 kg/m²     Physical Exam  Vitals reviewed.   Constitutional:       General: He is not in acute distress.     Appearance: He is well-developed.   HENT:      Head: Normocephalic.   Cardiovascular:      Rate and Rhythm: Normal rate and regular rhythm.      Heart sounds: Normal heart sounds.   Pulmonary:      Effort: Pulmonary effort is normal.      Breath sounds: Normal breath sounds.   Neurological:      Mental Status: He is alert and oriented to person, place, and time.      Gait: Gait normal.   Psychiatric:         Behavior: Behavior normal.         Thought " Content: Thought content normal.         Judgment: Judgment normal.        Result Review :   The following data was reviewed by: LENNIE Cleaning on 06/11/2021:  CMP    CMP 11/17/20   Glucose 144 (A)   BUN 16   Creatinine 1.04   eGFR Non  Am 73   eGFR  Am 88   Sodium 142   Potassium 4.8   Chloride 102   Calcium 9.6   Total Protein 6.9   Albumin 4.50   Globulin 2.4   Total Bilirubin 0.7   Alkaline Phosphatase 68   AST (SGOT) 11   ALT (SGPT) 13   (A) Abnormal value              Lipid Panel    Lipid Panel 11/17/20   Total Cholesterol 167   Triglycerides 217 (A)   HDL Cholesterol 43   VLDL Cholesterol 37   LDL Cholesterol  87   LDL/HDL Ratio 1.87   (A) Abnormal value            Most Recent A1C    HGBA1C Most Recent 6/11/21   Hemoglobin A1C 7.6           Microalbumin    Microalbumin 8/13/20   Microalbumin, Urine <3.0      Comments are available for some flowsheets but are not being displayed.           PSA    PSA 11/17/20 3/5/21   PSA 2.500 2.590      Comments are available for some flowsheets but are not being displayed.                     Assessment and Plan    Diagnoses and all orders for this visit:    1. Type 2 diabetes mellitus without complication, without long-term current use of insulin (CMS/[facility]) (Primary)  -     POC Glycosylated Hemoglobin (Hb A1C)  -     Comprehensive Metabolic Panel    2. Hyperlipidemia, unspecified hyperlipidemia type  -     Comprehensive Metabolic Panel  -     Lipid Panel With LDL / HDL Ratio    3. Gastroesophageal reflux disease without esophagitis    4. Insomnia, unspecified type  -     zolpidem CR (Ambien CR) 12.5 MG CR tablet; Take 1 tablet by mouth At Night As Needed for Sleep.  Dispense: 90 tablet; Refill: 0    5. Essential hypertension    6. High risk medication use  -     ToxASSURE Select 13 (MW) - Urine, Clean Catch    7. Screening for iron deficiency anemia  -     CBC & Differential        Follow Up   No follow-ups on file.  Patient was given instructions and  counseling regarding his condition or for health maintenance advice. Please see specific information pulled into the AVS if appropriate.     Cont same with all meds  Follow up after labs   rto in 3 mons     Not wanted colonoscopy at this time   Mask and googles worn

## 2021-06-15 ENCOUNTER — PRIOR AUTHORIZATION (OUTPATIENT)
Dept: FAMILY MEDICINE CLINIC | Facility: CLINIC | Age: 61
End: 2021-06-15

## 2021-06-18 LAB — DRUGS UR: NORMAL

## 2021-07-20 DIAGNOSIS — E78.5 HYPERLIPIDEMIA, UNSPECIFIED HYPERLIPIDEMIA TYPE: ICD-10-CM

## 2021-07-22 RX ORDER — SIMVASTATIN 20 MG
TABLET ORAL
Qty: 90 TABLET | Refills: 3 | Status: SHIPPED | OUTPATIENT
Start: 2021-07-22 | End: 2021-09-14

## 2021-08-05 DIAGNOSIS — E11.9 TYPE 2 DIABETES MELLITUS WITHOUT COMPLICATION, WITHOUT LONG-TERM CURRENT USE OF INSULIN (HCC): ICD-10-CM

## 2021-08-06 RX ORDER — SITAGLIPTIN 100 MG/1
TABLET, FILM COATED ORAL
Qty: 90 TABLET | Refills: 0 | Status: SHIPPED | OUTPATIENT
Start: 2021-08-06 | End: 2021-11-09

## 2021-08-11 DIAGNOSIS — N52.9 IMPOTENCE OF ORGANIC ORIGIN: ICD-10-CM

## 2021-08-12 RX ORDER — SILDENAFIL CITRATE 20 MG/1
TABLET ORAL
Qty: 30 TABLET | Refills: 0 | Status: SHIPPED | OUTPATIENT
Start: 2021-08-12

## 2021-08-12 NOTE — TELEPHONE ENCOUNTER
Rx Refill Note  Requested Prescriptions     Pending Prescriptions Disp Refills   • sildenafil (REVATIO) 20 MG tablet [Pharmacy Med Name: SILDENAFIL 20 MG TABLET] 30 tablet 0     Sig: TAKE ONE TABLET BY MOUTH DAILY AS NEEDED      Last office visit with prescribing clinician: 6/11/2021      Next office visit with prescribing clinician: 9/14/2021            Allan Leger MA  08/12/21, 08:10 EDT

## 2021-08-30 DIAGNOSIS — R35.0 BENIGN PROSTATIC HYPERPLASIA WITH URINARY FREQUENCY: ICD-10-CM

## 2021-08-30 DIAGNOSIS — N40.1 BENIGN PROSTATIC HYPERPLASIA WITH URINARY FREQUENCY: ICD-10-CM

## 2021-08-31 RX ORDER — TAMSULOSIN HYDROCHLORIDE 0.4 MG/1
CAPSULE ORAL
Qty: 90 CAPSULE | Refills: 3 | Status: SHIPPED | OUTPATIENT
Start: 2021-08-31 | End: 2022-11-28

## 2021-09-14 ENCOUNTER — OFFICE VISIT (OUTPATIENT)
Dept: FAMILY MEDICINE CLINIC | Facility: CLINIC | Age: 61
End: 2021-09-14

## 2021-09-14 VITALS
TEMPERATURE: 98 F | BODY MASS INDEX: 34.13 KG/M2 | DIASTOLIC BLOOD PRESSURE: 68 MMHG | SYSTOLIC BLOOD PRESSURE: 124 MMHG | HEART RATE: 77 BPM | OXYGEN SATURATION: 99 % | WEIGHT: 230.4 LBS | HEIGHT: 69 IN

## 2021-09-14 DIAGNOSIS — G47.00 INSOMNIA, UNSPECIFIED TYPE: Chronic | ICD-10-CM

## 2021-09-14 DIAGNOSIS — K21.9 GASTROESOPHAGEAL REFLUX DISEASE WITHOUT ESOPHAGITIS: Chronic | ICD-10-CM

## 2021-09-14 DIAGNOSIS — E11.9 TYPE 2 DIABETES MELLITUS WITHOUT COMPLICATION, WITHOUT LONG-TERM CURRENT USE OF INSULIN (HCC): Primary | Chronic | ICD-10-CM

## 2021-09-14 DIAGNOSIS — I10 ESSENTIAL HYPERTENSION: Chronic | ICD-10-CM

## 2021-09-14 DIAGNOSIS — E78.5 HYPERLIPIDEMIA, UNSPECIFIED HYPERLIPIDEMIA TYPE: Chronic | ICD-10-CM

## 2021-09-14 LAB — HBA1C MFR BLD: 8 %

## 2021-09-14 PROCEDURE — 99214 OFFICE O/P EST MOD 30 MIN: CPT | Performed by: NURSE PRACTITIONER

## 2021-09-14 RX ORDER — SIMVASTATIN 40 MG
40 TABLET ORAL NIGHTLY
Qty: 90 TABLET | Refills: 3 | Status: SHIPPED | OUTPATIENT
Start: 2021-09-14

## 2021-09-14 RX ORDER — ZOLPIDEM TARTRATE 10 MG/1
10 TABLET ORAL NIGHTLY PRN
Qty: 90 TABLET | Refills: 0 | Status: SHIPPED | OUTPATIENT
Start: 2021-09-14 | End: 2021-12-17 | Stop reason: SDUPTHER

## 2021-09-14 RX ORDER — SIMVASTATIN 40 MG
40 TABLET ORAL NIGHTLY
COMMUNITY
End: 2021-09-14 | Stop reason: SDUPTHER

## 2021-09-14 NOTE — PROGRESS NOTES
"Chief Complaint  Diabetes (Patient is needing his a1c checked ( wore mask and goggles) ), Insomnia (Patient is wanting to go back to 10mg on the ambien ), and Hyperlipidemia (Patients simvatstatin was increased to 40mg he has been taking 2 20mg he needs new script for 40mg sent in )    Subjective          Ki Draper presents to [facility]  History of Present Illness  Diabetes-patient is currently on Januvia 100 mg, Metformin 1000 mg twice a day and Jardiance 25 mg daily as directed without any side effects.  A1c was last checked in June 2021 with result of 7.6.  A1c is 8 today.  He admits to not being the best with his diet    Insomnia-patient currently on Ambien CR 12.5 mg nightly as directed without any side effects but it is more expensive so requesting to go back to ambien 10mg    Hypertension-patient is currently on lisinopril 20 mg daily as directed without any side effects.  Blood pressures well controlled in the office today.    Hyperlipidemia-cholesterol was last checked in June 2021 with a total cholesterol of 192 and an LDL of 99.  He was increased to simvastatin 40 mg daily after these labs. He is taking as directed without any side effects.    Objective   Vital Signs:   /68   Pulse 77   Temp 98 °F (36.7 °C)   Ht 175.3 cm (69.02\")   Wt 105 kg (230 lb 6.4 oz)   SpO2 99%   BMI 34.01 kg/m²     Physical Exam  Vitals reviewed.   Constitutional:       General: He is not in acute distress.     Appearance: He is well-developed.   HENT:      Head: Normocephalic.   Cardiovascular:      Rate and Rhythm: Normal rate and regular rhythm.      Heart sounds: Normal heart sounds.   Pulmonary:      Effort: Pulmonary effort is normal.      Breath sounds: Normal breath sounds.   Neurological:      Mental Status: He is alert and oriented to person, place, and time.      Gait: Gait normal.   Psychiatric:         Behavior: Behavior normal.         Thought Content: Thought content " normal.         Judgment: Judgment normal.        Result Review :   The following data was reviewed by: LENNIE Cleaning on 09/14/2021:  CMP    CMP 11/17/20 6/11/21   Glucose 144 (A) 169 (A)   BUN 16 15   Creatinine 1.04 1.12   eGFR Non  Am 73 67   eGFR African Am 88 81   Sodium 142 141   Potassium 4.8 4.6   Chloride 102 102   Calcium 9.6 9.5   Total Protein 6.9 6.9   Albumin 4.50 4.60   Globulin 2.4 2.3   Total Bilirubin 0.7 0.9   Alkaline Phosphatase 68 64   AST (SGOT) 11 12   ALT (SGPT) 13 13   (A) Abnormal value       Comments are available for some flowsheets but are not being displayed.           CBC w/diff    CBC w/Diff 6/11/21   WBC 6.60   RBC 5.41   Hemoglobin 16.6   Hematocrit 50.0   MCV 92.4   MCH 30.7   MCHC 33.2   RDW 13.3   Platelets 282   Neutrophil Rel % 60.5   Lymphocyte Rel % 25.9   Monocyte Rel % 8.3   Eosinophil Rel % 3.3   Basophil Rel % 1.4           Lipid Panel    Lipid Panel 11/17/20 6/11/21   Total Cholesterol 167 192   Triglycerides 217 (A) 324 (A)   HDL Cholesterol 43 38 (A)   VLDL Cholesterol 37 55 (A)   LDL Cholesterol  87 99   LDL/HDL Ratio 1.87 2.35   (A) Abnormal value       Comments are available for some flowsheets but are not being displayed.           Most Recent A1C    HGBA1C Most Recent 9/14/21   Hemoglobin A1C 8.0           PSA    PSA 11/17/20 3/5/21   PSA 2.500 2.590      Comments are available for some flowsheets but are not being displayed.                     Assessment and Plan    Diagnoses and all orders for this visit:    1. Type 2 diabetes mellitus without complication, without long-term current use of insulin (CMS/[facility]) (Primary)  -     POC Glycosylated Hemoglobin (Hb A1C)    2. Insomnia, unspecified type  -     zolpidem (AMBIEN) 10 MG tablet; Take 1 tablet by mouth At Night As Needed for Sleep.  Dispense: 90 tablet; Refill: 0    3. Essential hypertension    4. Gastroesophageal reflux disease without esophagitis    5. Hyperlipidemia, unspecified hyperlipidemia  type  -     simvastatin (ZOCOR) 40 MG tablet; Take 1 tablet by mouth Every Night.  Dispense: 90 tablet; Refill: 3        Follow Up   Return in about 3 months (around 12/14/2021) for Recheck.  Patient was given instructions and counseling regarding his condition or for health maintenance advice. Please see specific information pulled into the AVS if appropriate.     Pt wants to wait until next year for colonoscopy  Continue same with all medications.  I stressed diet changes including focusing on lean meats and vegetables.  Exercising.  Return to the office in 3 months no medication changes at this time but in 3 months if it is the same or worse we will be adding an injection  Mask and googles worn

## 2021-11-08 DIAGNOSIS — E11.9 TYPE 2 DIABETES MELLITUS WITHOUT COMPLICATION, WITHOUT LONG-TERM CURRENT USE OF INSULIN (HCC): ICD-10-CM

## 2021-11-09 RX ORDER — SITAGLIPTIN 100 MG/1
TABLET, FILM COATED ORAL
Qty: 30 TABLET | Refills: 0 | Status: SHIPPED | OUTPATIENT
Start: 2021-11-09 | End: 2021-12-10

## 2021-11-17 DIAGNOSIS — E11.9 TYPE 2 DIABETES MELLITUS WITHOUT COMPLICATION, WITHOUT LONG-TERM CURRENT USE OF INSULIN (HCC): ICD-10-CM

## 2021-11-17 DIAGNOSIS — I10 ESSENTIAL HYPERTENSION: ICD-10-CM

## 2021-11-18 RX ORDER — LISINOPRIL 20 MG/1
TABLET ORAL
Qty: 90 TABLET | Refills: 3 | Status: SHIPPED | OUTPATIENT
Start: 2021-11-18 | End: 2022-11-28

## 2021-11-18 RX ORDER — EMPAGLIFLOZIN 25 MG/1
TABLET, FILM COATED ORAL
Qty: 90 TABLET | Refills: 1 | Status: SHIPPED | OUTPATIENT
Start: 2021-11-18 | End: 2022-05-16

## 2021-11-18 NOTE — TELEPHONE ENCOUNTER
Rx Refill Note  Requested Prescriptions     Pending Prescriptions Disp Refills   • Jardiance 25 MG tablet tablet [Pharmacy Med Name: JARDIANCE 25 MG TABLET] 30 tablet      Sig: TAKE ONE TABLET BY MOUTH DAILY     Signed Prescriptions Disp Refills   • lisinopril (PRINIVIL,ZESTRIL) 20 MG tablet 90 tablet 3     Sig: TAKE ONE TABLET BY MOUTH DAILY     Authorizing Provider: NEREYDA COLLINS     Ordering User: HARPER VELASCO      Last office visit with prescribing clinician: 9/14/2021      Next office visit with prescribing clinician: 12/17/2021            Harper Velasco MA  11/18/21, 07:37 EST

## 2021-12-09 DIAGNOSIS — E11.9 TYPE 2 DIABETES MELLITUS WITHOUT COMPLICATION, WITHOUT LONG-TERM CURRENT USE OF INSULIN (HCC): ICD-10-CM

## 2021-12-10 RX ORDER — SITAGLIPTIN 100 MG/1
TABLET, FILM COATED ORAL
Qty: 30 TABLET | Refills: 0 | Status: SHIPPED | OUTPATIENT
Start: 2021-12-10 | End: 2022-01-10

## 2021-12-17 ENCOUNTER — OFFICE VISIT (OUTPATIENT)
Dept: FAMILY MEDICINE CLINIC | Facility: CLINIC | Age: 61
End: 2021-12-17

## 2021-12-17 VITALS
WEIGHT: 228 LBS | HEIGHT: 69 IN | TEMPERATURE: 97.1 F | OXYGEN SATURATION: 99 % | BODY MASS INDEX: 33.77 KG/M2 | HEART RATE: 76 BPM | SYSTOLIC BLOOD PRESSURE: 114 MMHG | DIASTOLIC BLOOD PRESSURE: 66 MMHG

## 2021-12-17 DIAGNOSIS — E11.9 TYPE 2 DIABETES MELLITUS WITHOUT COMPLICATION, WITHOUT LONG-TERM CURRENT USE OF INSULIN (HCC): Primary | ICD-10-CM

## 2021-12-17 DIAGNOSIS — I10 ESSENTIAL HYPERTENSION: Chronic | ICD-10-CM

## 2021-12-17 DIAGNOSIS — G47.00 INSOMNIA, UNSPECIFIED TYPE: Chronic | ICD-10-CM

## 2021-12-17 DIAGNOSIS — E78.5 HYPERLIPIDEMIA, UNSPECIFIED HYPERLIPIDEMIA TYPE: Chronic | ICD-10-CM

## 2021-12-17 LAB
EXPIRATION DATE: ABNORMAL
HBA1C MFR BLD: 7.8 %
Lab: ABNORMAL

## 2021-12-17 PROCEDURE — 83036 HEMOGLOBIN GLYCOSYLATED A1C: CPT | Performed by: NURSE PRACTITIONER

## 2021-12-17 PROCEDURE — 36416 COLLJ CAPILLARY BLOOD SPEC: CPT | Performed by: NURSE PRACTITIONER

## 2021-12-17 PROCEDURE — 99214 OFFICE O/P EST MOD 30 MIN: CPT | Performed by: NURSE PRACTITIONER

## 2021-12-17 RX ORDER — ZOLPIDEM TARTRATE 10 MG/1
10 TABLET ORAL NIGHTLY PRN
Qty: 90 TABLET | Refills: 0 | Status: SHIPPED | OUTPATIENT
Start: 2021-12-17 | End: 2022-03-18 | Stop reason: SDUPTHER

## 2021-12-17 NOTE — PROGRESS NOTES
"Chief Complaint  Diabetes (Patient is here to get a1c and go over medications ( wore mask and goggles) ) and Insomnia (Patient is needing his ambien refilled last drug screen was in june )    Subjective          Ki Draper presents to [facility]  History of Present Illness  Diabetes-patient is currently on Januvia 100 mg Jardiance 25 mg Metformin 1000 mg twice a day as directed without any side effects.  A1c was last checked in September with a result of 8.0.    Hyperlipidemia-patient is currently on simvastatin 40 mg daily as directed any side effects.  Cholesterol was last checked in June with a total cholesterol 192 and an LDL of 99. Pt thinks that his blood sugar has increased since increasing the simvastatin and is going to quit the medication to test the theory    Pretension-patient is currently on lisinopril 20 mg daily as directed by side effects.  Blood pressures well controlled in the office today.    Insomnia-patient is currently on Ambien 10 mg nightly as directed by side effects working well to control insomnia    Objective   Vital Signs:   /66   Pulse 76   Temp 97.1 °F (36.2 °C)   Ht 175.3 cm (69.02\")   Wt 103 kg (228 lb)   SpO2 99%   BMI 33.65 kg/m²     Physical Exam  Vitals reviewed.   Constitutional:       General: He is not in acute distress.     Appearance: He is well-developed.   HENT:      Head: Normocephalic.   Cardiovascular:      Rate and Rhythm: Normal rate and regular rhythm.      Heart sounds: Normal heart sounds.   Pulmonary:      Effort: Pulmonary effort is normal.      Breath sounds: Normal breath sounds.   Neurological:      Mental Status: He is alert and oriented to person, place, and time.      Gait: Gait normal.   Psychiatric:         Behavior: Behavior normal.         Thought Content: Thought content normal.         Judgment: Judgment normal.        Result Review :   The following data was reviewed by: LENNIE Cleaning on " 12/17/2021:  CMP    CMP 6/11/21   Glucose 169 (A)   BUN 15   Creatinine 1.12   eGFR Non  Am 67   eGFR African Am 81   Sodium 141   Potassium 4.6   Chloride 102   Calcium 9.5   Total Protein 6.9   Albumin 4.60   Globulin 2.3   Total Bilirubin 0.9   Alkaline Phosphatase 64   AST (SGOT) 12   ALT (SGPT) 13   (A) Abnormal value       Comments are available for some flowsheets but are not being displayed.           CBC w/diff    CBC w/Diff 6/11/21   WBC 6.60   RBC 5.41   Hemoglobin 16.6   Hematocrit 50.0   MCV 92.4   MCH 30.7   MCHC 33.2   RDW 13.3   Platelets 282   Neutrophil Rel % 60.5   Lymphocyte Rel % 25.9   Monocyte Rel % 8.3   Eosinophil Rel % 3.3   Basophil Rel % 1.4           Lipid Panel    Lipid Panel 6/11/21   Total Cholesterol 192   Triglycerides 324 (A)   HDL Cholesterol 38 (A)   VLDL Cholesterol 55 (A)   LDL Cholesterol  99   LDL/HDL Ratio 2.35   (A) Abnormal value       Comments are available for some flowsheets but are not being displayed.               Most Recent A1C    HGBA1C Most Recent 12/17/21   Hemoglobin A1C 7.8           PSA    PSA 3/5/21   PSA 2.590      Comments are available for some flowsheets but are not being displayed.                     Assessment and Plan    Diagnoses and all orders for this visit:    1. Type 2 diabetes mellitus without complication, without long-term current use of insulin (HCC) (Primary)  -     POC Glycosylated Hemoglobin (Hb A1C)    2. Hyperlipidemia, unspecified hyperlipidemia type  -     Lipid Panel With LDL / HDL Ratio    3. Essential hypertension    4. Insomnia, unspecified type  -     zolpidem (AMBIEN) 10 MG tablet; Take 1 tablet by mouth At Night As Needed for Sleep.  Dispense: 90 tablet; Refill: 0        Follow Up   Return in about 3 months (around 3/17/2022) for Recheck.  Patient was given instructions and counseling regarding his condition or for health maintenance advice. Please see specific information pulled into the AVS if appropriate.     rtol in  3 mons   Cont same with meds  Follow up after lab  Mask and googles worn     Lab: [id number] Lab: [id number]

## 2021-12-18 LAB
CHOLEST SERPL-MCNC: 259 MG/DL (ref 100–199)
HDLC SERPL-MCNC: 36 MG/DL
LDLC SERPL CALC-MCNC: 164 MG/DL (ref 0–99)
LDLC/HDLC SERPL: 4.6 RATIO (ref 0–3.6)
TRIGL SERPL-MCNC: 313 MG/DL (ref 0–149)
VLDLC SERPL CALC-MCNC: 59 MG/DL (ref 5–40)

## 2022-01-09 DIAGNOSIS — E11.9 TYPE 2 DIABETES MELLITUS WITHOUT COMPLICATION, WITHOUT LONG-TERM CURRENT USE OF INSULIN: ICD-10-CM

## 2022-01-10 RX ORDER — SITAGLIPTIN 100 MG/1
TABLET, FILM COATED ORAL
Qty: 30 TABLET | Refills: 0 | Status: SHIPPED | OUTPATIENT
Start: 2022-01-10 | End: 2022-02-14

## 2022-02-07 DIAGNOSIS — E11.9 TYPE 2 DIABETES MELLITUS WITHOUT COMPLICATION, WITHOUT LONG-TERM CURRENT USE OF INSULIN: ICD-10-CM

## 2022-02-12 DIAGNOSIS — E11.9 TYPE 2 DIABETES MELLITUS WITHOUT COMPLICATION, WITHOUT LONG-TERM CURRENT USE OF INSULIN: ICD-10-CM

## 2022-02-14 RX ORDER — SITAGLIPTIN 100 MG/1
TABLET, FILM COATED ORAL
Qty: 30 TABLET | Refills: 3 | Status: SHIPPED | OUTPATIENT
Start: 2022-02-14 | End: 2022-06-16

## 2022-02-17 ENCOUNTER — PRE-PROCEDURE SCREENING (OUTPATIENT)
Dept: GASTROENTEROLOGY | Facility: CLINIC | Age: 62
End: 2022-02-17

## 2022-02-17 NOTE — TELEPHONE ENCOUNTER
Last scope 1/19/10 in epic--Personal history of polyps--No family history polyps or colon cancer--No blood thinners--Medications:          Januvia 100 MG tablet  Jardiance 25 MG tablet tablet  lansoprazole (PREVACID) 15 MG capsule  lisinopril (PRINIVIL,ZESTRIL) 20 MG tablet  metFORMIN (GLUCOPHAGE) 1000 MG tablet  Multiple Vitamin (MULTI VITAMIN PO)  Omega-3 Fatty Acids (FISH OIL) 1000 MG capsule capsule  sildenafil (REVATIO) 20 MG tablet  simvastatin (ZOCOR) 40 MG tablet  tamsulosin (FLOMAX) 0.4 MG capsule 24 hr capsule  zolpidem (AMBIEN) 10 MG tablet        Pt verbalized and understood that it would be few weeks before been schedule

## 2022-02-18 DIAGNOSIS — Z12.12 SCREENING FOR COLORECTAL CANCER: Primary | ICD-10-CM

## 2022-02-18 DIAGNOSIS — Z12.11 SCREENING FOR COLORECTAL CANCER: Primary | ICD-10-CM

## 2022-02-23 ENCOUNTER — TELEPHONE (OUTPATIENT)
Dept: GASTROENTEROLOGY | Facility: CLINIC | Age: 62
End: 2022-02-23

## 2022-02-23 NOTE — TELEPHONE ENCOUNTER
[facility] for colonoscopy on 06/03/2022  arrive at 1030am   . Mailed Prep instructions to Mailing address on-file. ----[facility]      Advised PT  that  will call with final arrival time  24 hrs before procedure. If they do not get a phone call, arrival time will stay the same as given on instructions

## 2022-03-18 DIAGNOSIS — G47.00 INSOMNIA, UNSPECIFIED TYPE: Chronic | ICD-10-CM

## 2022-03-18 NOTE — TELEPHONE ENCOUNTER
Rx Refill Note  Requested Prescriptions      No prescriptions requested or ordered in this encounter      Last office visit with prescribing clinician: 12/17/2021      Next office visit with prescribing clinician: 3/31/2022            Billie Quintanilla MA  03/18/22, 09:22 EDT

## 2022-03-21 RX ORDER — ZOLPIDEM TARTRATE 10 MG/1
10 TABLET ORAL NIGHTLY PRN
Qty: 90 TABLET | Refills: 0 | Status: SHIPPED | OUTPATIENT
Start: 2022-03-21

## 2022-03-31 ENCOUNTER — OFFICE VISIT (OUTPATIENT)
Dept: FAMILY MEDICINE CLINIC | Facility: CLINIC | Age: 62
End: 2022-03-31

## 2022-03-31 VITALS
HEIGHT: 69 IN | HEART RATE: 82 BPM | BODY MASS INDEX: 33.92 KG/M2 | SYSTOLIC BLOOD PRESSURE: 110 MMHG | DIASTOLIC BLOOD PRESSURE: 68 MMHG | TEMPERATURE: 97.3 F | WEIGHT: 229 LBS | OXYGEN SATURATION: 99 %

## 2022-03-31 DIAGNOSIS — Z12.5 SPECIAL SCREENING FOR MALIGNANT NEOPLASM OF PROSTATE: ICD-10-CM

## 2022-03-31 DIAGNOSIS — I10 ESSENTIAL HYPERTENSION: Chronic | ICD-10-CM

## 2022-03-31 DIAGNOSIS — E11.9 TYPE 2 DIABETES MELLITUS WITHOUT COMPLICATION, WITHOUT LONG-TERM CURRENT USE OF INSULIN: Primary | Chronic | ICD-10-CM

## 2022-03-31 DIAGNOSIS — E78.5 HYPERLIPIDEMIA, UNSPECIFIED HYPERLIPIDEMIA TYPE: Chronic | ICD-10-CM

## 2022-03-31 LAB
EXPIRATION DATE: ABNORMAL
HBA1C MFR BLD: 7.7 %
Lab: ABNORMAL

## 2022-03-31 PROCEDURE — 99214 OFFICE O/P EST MOD 30 MIN: CPT | Performed by: NURSE PRACTITIONER

## 2022-03-31 PROCEDURE — 36416 COLLJ CAPILLARY BLOOD SPEC: CPT | Performed by: NURSE PRACTITIONER

## 2022-03-31 PROCEDURE — 83036 HEMOGLOBIN GLYCOSYLATED A1C: CPT | Performed by: NURSE PRACTITIONER

## 2022-03-31 NOTE — PROGRESS NOTES
"Chief Complaint  Diabetes (A1c check )    Subjective          Ki Draper presents to [facility]  History of Present Illness  Diabetes-patient is currently on Jardiance 25 mg, Januvia 100 mg and Metformin 1000 mg twice a day.  A1c was last checked in December 2021 with a result of 7.8.    Hypertension-patient is on lisinopril 20 mg daily as directed without any side effects.  Blood pressures well controlled in office today.    Hyperlipidemia-patient is on simvastatin 40 mg daily as directed without any side effects.  Cholesterol was last checked in June 2021 with a total cholesterol of 192 and an LDL of 99.    Insomnia-patient is on Ambien 10 mg nightly as directed without any side effects.  Working well to control insomnia.    Objective   Vital Signs:   /68   Pulse 82   Temp 97.3 °F (36.3 °C)   Ht 175.3 cm (69\")   Wt 104 kg (229 lb)   SpO2 99%   BMI 33.82 kg/m²            Physical Exam  Vitals reviewed.   Constitutional:       General: He is not in acute distress.     Appearance: He is well-developed.   HENT:      Head: Normocephalic.   Cardiovascular:      Rate and Rhythm: Normal rate and regular rhythm.      Heart sounds: Normal heart sounds.   Pulmonary:      Effort: Pulmonary effort is normal.      Breath sounds: Normal breath sounds.   Neurological:      Mental Status: He is alert and oriented to person, place, and time.      Gait: Gait normal.   Psychiatric:         Behavior: Behavior normal.         Thought Content: Thought content normal.         Judgment: Judgment normal.        Result Review :   The following data was reviewed by: LENNIE Cleaning on 03/31/2022:  CMP    CMP 6/11/21   Glucose 169 (A)   BUN 15   Creatinine 1.12   eGFR Non  Am 67   eGFR African Am 81   Sodium 141   Potassium 4.6   Chloride 102   Calcium 9.5   Total Protein 6.9   Albumin 4.60   Globulin 2.3   Total Bilirubin 0.9   Alkaline Phosphatase 64   AST (SGOT) 12   ALT (SGPT) 13 "   (A) Abnormal value       Comments are available for some flowsheets but are not being displayed.           CBC w/diff    CBC w/Diff 6/11/21   WBC 6.60   RBC 5.41   Hemoglobin 16.6   Hematocrit 50.0   MCV 92.4   MCH 30.7   MCHC 33.2   RDW 13.3   Platelets 282   Neutrophil Rel % 60.5   Lymphocyte Rel % 25.9   Monocyte Rel % 8.3   Eosinophil Rel % 3.3   Basophil Rel % 1.4           Lipid Panel    Lipid Panel 6/11/21 12/17/21   Total Cholesterol 192 259 (A)   Triglycerides 324 (A) 313 (A)   HDL Cholesterol 38 (A) 36 (A)   VLDL Cholesterol 55 (A) 59 (A)   LDL Cholesterol  99 164 (A)   LDL/HDL Ratio 2.35 4.6 (A)   (A) Abnormal value       Comments are available for some flowsheets but are not being displayed.               Most Recent A1C    HGBA1C Most Recent 12/17/21   Hemoglobin A1C 7.8                         Assessment and Plan    Diagnoses and all orders for this visit:    1. Type 2 diabetes mellitus without complication, without long-term current use of insulin (HCC) (Primary)  -     Comprehensive Metabolic Panel  -     MicroAlbumin, Urine, Random - Urine, Clean Catch    2. Essential hypertension    3. Hyperlipidemia, unspecified hyperlipidemia type    4. Special screening for malignant neoplasm of prostate  -     PSA Screen        Follow Up   Return in about 3 months (around 6/30/2022) for Recheck.  Patient was given instructions and counseling regarding his condition or for health maintenance advice. Please see specific information pulled into the AVS if appropriate.     Cont same with meds  rto in 3 mons     Mask and googles worn

## 2022-04-01 LAB
ALBUMIN SERPL-MCNC: 4.3 G/DL (ref 3.8–4.8)
ALBUMIN/GLOB SERPL: 1.9 {RATIO} (ref 1.2–2.2)
ALP SERPL-CCNC: 72 IU/L (ref 44–121)
ALT SERPL-CCNC: 19 IU/L (ref 0–44)
AST SERPL-CCNC: 15 IU/L (ref 0–40)
BILIRUB SERPL-MCNC: 0.7 MG/DL (ref 0–1.2)
BUN SERPL-MCNC: 15 MG/DL (ref 8–27)
BUN/CREAT SERPL: 13 (ref 10–24)
CALCIUM SERPL-MCNC: 9.5 MG/DL (ref 8.6–10.2)
CHLORIDE SERPL-SCNC: 100 MMOL/L (ref 96–106)
CO2 SERPL-SCNC: 22 MMOL/L (ref 20–29)
CREAT SERPL-MCNC: 1.15 MG/DL (ref 0.76–1.27)
EGFRCR SERPLBLD CKD-EPI 2021: 72 ML/MIN/1.73
GLOBULIN SER CALC-MCNC: 2.3 G/DL (ref 1.5–4.5)
GLUCOSE SERPL-MCNC: 159 MG/DL (ref 65–99)
MICROALBUMIN UR-MCNC: <3 UG/ML
POTASSIUM SERPL-SCNC: 4.6 MMOL/L (ref 3.5–5.2)
PROT SERPL-MCNC: 6.6 G/DL (ref 6–8.5)
PSA SERPL-MCNC: 2.9 NG/ML (ref 0–4)
SODIUM SERPL-SCNC: 138 MMOL/L (ref 134–144)

## 2022-05-16 DIAGNOSIS — E11.9 TYPE 2 DIABETES MELLITUS WITHOUT COMPLICATION, WITHOUT LONG-TERM CURRENT USE OF INSULIN: ICD-10-CM

## 2022-05-16 RX ORDER — EMPAGLIFLOZIN 25 MG/1
TABLET, FILM COATED ORAL
Qty: 90 TABLET | Refills: 1 | Status: SHIPPED | OUTPATIENT
Start: 2022-05-16 | End: 2022-11-14

## 2022-06-15 DIAGNOSIS — E11.9 TYPE 2 DIABETES MELLITUS WITHOUT COMPLICATION, WITHOUT LONG-TERM CURRENT USE OF INSULIN: ICD-10-CM

## 2022-06-16 RX ORDER — SITAGLIPTIN 100 MG/1
TABLET, FILM COATED ORAL
Qty: 30 TABLET | Refills: 3 | Status: SHIPPED | OUTPATIENT
Start: 2022-06-16 | End: 2022-06-17 | Stop reason: SDUPTHER

## 2022-06-17 DIAGNOSIS — E11.9 TYPE 2 DIABETES MELLITUS WITHOUT COMPLICATION, WITHOUT LONG-TERM CURRENT USE OF INSULIN: ICD-10-CM

## 2022-06-17 NOTE — TELEPHONE ENCOUNTER
Caller: Ki Draper    Relationship: Self    Best call back number: [phone]    Requested Prescriptions:   Requested Prescriptions     Pending Prescriptions Disp Refills   • SITagliptin (Januvia) 100 MG tablet 30 tablet 3     Sig: Take 1 tablet by mouth Daily.        Pharmacy where request should be sent: [facility] AT [facility] [phone] [facility] [phone] FX     Additional details provided by patient: PATIENT HAS BEEN OUT OF MEDICATION SINCE 06/13/2022. PATIENT STATED THAT HE SAW IN [facility] THAT THE MEDICATION WAS APPROVED BUT [facility] IS SAYING THEY DO NOT HAVE THE PRESCRIPTION AND THERE WAS AN ISSUE WITH INSURANCE. PLEASE ADVISE.     Does the patient have less than a 3 day supply:  [x] Yes  [] No    Bennie Grande Rep   06/17/22 11:55 EDT

## 2022-06-20 ENCOUNTER — TELEPHONE (OUTPATIENT)
Dept: FAMILY MEDICINE CLINIC | Facility: CLINIC | Age: 62
End: 2022-06-20

## 2022-06-20 ENCOUNTER — PRIOR AUTHORIZATION (OUTPATIENT)
Dept: FAMILY MEDICINE CLINIC | Facility: CLINIC | Age: 62
End: 2022-06-20

## 2022-06-20 NOTE — TELEPHONE ENCOUNTER
Hub staff attempted to follow warm transfer process and was unsuccessful     Caller: Ki Draper    Relationship to patient: Self    Best call back number: [phone] (H)    Patient is needing: PATIENT CALLED TO CHECK THE STATUS OF THE PRIOR AUTHORIZATION THAT IS NEEDED FOR HIS SITagliptin (Januvia) 100 MG tablet. PATIENT HAS THAT HE HAS A 3 DAY SUPPLY LEFT.     PLEASE CONT PATIENT TO ADVISE.        DERICK

## 2022-08-11 ENCOUNTER — OFFICE VISIT (OUTPATIENT)
Dept: FAMILY MEDICINE CLINIC | Facility: CLINIC | Age: 62
End: 2022-08-11

## 2022-08-11 VITALS
SYSTOLIC BLOOD PRESSURE: 122 MMHG | HEART RATE: 64 BPM | HEIGHT: 69 IN | WEIGHT: 227 LBS | DIASTOLIC BLOOD PRESSURE: 82 MMHG | OXYGEN SATURATION: 99 % | BODY MASS INDEX: 33.62 KG/M2 | TEMPERATURE: 97.1 F | RESPIRATION RATE: 16 BRPM

## 2022-08-11 DIAGNOSIS — E11.9 TYPE 2 DIABETES MELLITUS WITHOUT COMPLICATION, WITHOUT LONG-TERM CURRENT USE OF INSULIN: Primary | ICD-10-CM

## 2022-08-11 DIAGNOSIS — E78.2 MIXED HYPERLIPIDEMIA: Chronic | ICD-10-CM

## 2022-08-11 DIAGNOSIS — R97.20 ELEVATED PSA: ICD-10-CM

## 2022-08-11 DIAGNOSIS — I10 ESSENTIAL HYPERTENSION: Chronic | ICD-10-CM

## 2022-08-11 DIAGNOSIS — F51.01 PRIMARY INSOMNIA: Chronic | ICD-10-CM

## 2022-08-11 DIAGNOSIS — N40.1 BENIGN PROSTATIC HYPERPLASIA WITH URINARY FREQUENCY: Chronic | ICD-10-CM

## 2022-08-11 DIAGNOSIS — K21.9 GASTROESOPHAGEAL REFLUX DISEASE WITHOUT ESOPHAGITIS: Chronic | ICD-10-CM

## 2022-08-11 DIAGNOSIS — R35.0 BENIGN PROSTATIC HYPERPLASIA WITH URINARY FREQUENCY: Chronic | ICD-10-CM

## 2022-08-11 PROBLEM — Z13.0 SCREENING FOR IRON DEFICIENCY ANEMIA: Status: RESOLVED | Noted: 2019-02-26 | Resolved: 2022-08-11

## 2022-08-11 LAB
EXPIRATION DATE: ABNORMAL
HBA1C MFR BLD: 7.8 %
Lab: ABNORMAL

## 2022-08-11 PROCEDURE — 83036 HEMOGLOBIN GLYCOSYLATED A1C: CPT | Performed by: FAMILY MEDICINE

## 2022-08-11 PROCEDURE — 3051F HG A1C>EQUAL 7.0%<8.0%: CPT | Performed by: FAMILY MEDICINE

## 2022-08-11 PROCEDURE — 36416 COLLJ CAPILLARY BLOOD SPEC: CPT | Performed by: FAMILY MEDICINE

## 2022-08-11 PROCEDURE — 99214 OFFICE O/P EST MOD 30 MIN: CPT | Performed by: FAMILY MEDICINE

## 2022-08-11 NOTE — PROGRESS NOTES
Subjective     Ki Draper is a 62 y.o. male.     Chief Complaint   Patient presents with   • Hypertension   • Hyperlipidemia     Patient is not fasting.   • Diabetes   • Insomnia     Not taking medication. Wants to wing off med's.   • Establish Care       History of Present Illness     Pt usually sees LENNIE Combs , today to establish care with me to discuss the following;    Diabetes  Has type 2 diabetes mellitus.   There are no hypoglycemic associated symptoms or complications.  Risk factors for coronary artery disease include diabetes mellitus, dyslipidemia, obesity, hypertension and sedentary lifestyle.   Current diabetic treatment includes 3 ORAL Rx  Pt is compliant with treatment.  Pt monitors blood glucose at home.  FBG range is generally 120-140   An ACE inhibitor/angiotensin II receptor blocker is being taken.  Eating healthy diet   Not on statin , he declined Rx with statin or any other alternative     Lab Results   Component Value Date    HGBA1C 7.8 08/11/2022     HTN  -This is a chronic problem. Home BP reading wnl  -salt intake : low sodium diet   -associated signs and symptoms: none  -BP is well controlled.   -medication compliance: taking as prescribed, No S/E reported from current Rx   -Denies  blurred vision, chest pain, neck pain, orthopnea or shortness of breath.     GERD;  well controlled with current Rx, no S/E reported.     BPH with h/o elevated PSA; doing well on rx, no S/E reported. Following with urology     Insomnia; he retired in June, no longer needs Ambien. He is off on this medication for 2 months.     Had CSC s/p removal of 2 polyps ,Bx benign , due for one. He will call and make the appoin       Hyperlipidemia  This is a chronic problem. Last lipid tests were reviewed .  Eating healthy  Current antihyperlipidemic treatment includes diet . He was on lipitor which makes his BG worse so he stopped taking it and he dose not want any medication for that.    Risk factors for coronary artery  disease include dyslipidemia.     Lab Results   Component Value Date    CHLPL 259 (H) 12/17/2021    TRIG 313 (H) 12/17/2021    HDL 36 (L) 12/17/2021     (H) 12/17/2021         The following portions of the patient's history were reviewed and updated as appropriate: allergies, current medications, past family history, past medical history, past social history, past surgical history and problem list.        Review of Systems   Cardiovascular: Negative for chest pain, palpitations and leg swelling.       Vitals:    08/11/22 1326   BP: 122/82   Pulse: 64   Resp: 16   Temp: 97.1 °F (36.2 °C)   SpO2: 99%           08/11/22  1326   Weight: 103 kg (227 lb)         Body mass index is 33.52 kg/m².      Current Outpatient Medications   Medication Sig Dispense Refill   • lansoprazole (PREVACID) 15 MG capsule Take 15 mg by mouth Daily.     • lisinopril (PRINIVIL,ZESTRIL) 20 MG tablet TAKE ONE TABLET BY MOUTH DAILY 90 tablet 3   • metFORMIN (GLUCOPHAGE) 1000 MG tablet TAKE ONE TABLET BY MOUTH TWICE A DAY WITH MEALS 180 tablet 3   • Multiple Vitamin (MULTI VITAMIN PO) Take  by mouth.     • Omega-3 Fatty Acids (FISH OIL) 1000 MG capsule capsule Take  by mouth Daily With Breakfast.     • sildenafil (REVATIO) 20 MG tablet TAKE ONE TABLET BY MOUTH DAILY AS NEEDED 30 tablet 0   • SITagliptin (Januvia) 100 MG tablet Take 1 tablet by mouth Daily. 30 tablet 3   • tamsulosin (FLOMAX) 0.4 MG capsule 24 hr capsule TAKE ONE CAPSULE BY MOUTH DAILY 90 capsule 3   • Jardiance 25 MG tablet tablet TAKE ONE TABLET BY MOUTH DAILY 90 tablet 1   • simvastatin (ZOCOR) 40 MG tablet Take 1 tablet by mouth Every Night. 90 tablet 3   • zolpidem (AMBIEN) 10 MG tablet Take 1 tablet by mouth At Night As Needed for Sleep. 90 tablet 0     No current facility-administered medications for this visit.                Objective   Physical Exam  Vitals and nursing note reviewed.   Constitutional:       General: He is not in acute distress.     Appearance: He  is not ill-appearing, toxic-appearing or diaphoretic.   HENT:      Mouth/Throat:      Mouth: Mucous membranes are moist.   Eyes:      Conjunctiva/sclera: Conjunctivae normal.   Neck:      Comments: No enlarged thyroid    Cardiovascular:      Rate and Rhythm: Normal rate and regular rhythm.      Heart sounds: Normal heart sounds. No murmur heard.    No friction rub. No gallop.   Pulmonary:      Effort: Pulmonary effort is normal. No respiratory distress.      Breath sounds: Normal breath sounds. No stridor. No wheezing or rhonchi.   Musculoskeletal:      Cervical back: Neck supple.      Right lower leg: No edema.      Left lower leg: No edema.   Neurological:      Mental Status: He is alert and oriented to person, place, and time.   Psychiatric:         Mood and Affect: Mood normal.         Behavior: Behavior normal.         Thought Content: Thought content normal.         Judgment: Judgment normal.           Assessment & Plan   Diagnoses and all orders for this visit:    1. Type 2 diabetes mellitus without complication, without long-term current use of insulin (HCC) (Primary)  -     POC Glycosylated Hemoglobin (Hb A1C)  - Stable ,continue same Rx and diet   Continue diet and lifestyle modifications as prescribed.   Encouraged patient to maintain a diabetic diet, Increase lean protein and vegetable intake. Avoid sugary drinks and processed carbs including crackers, cookies, cakes.       2. Essential hypertension  - Stable, continue current Rx  Encouraged patient to maintain a heart healthy diet/DASH diet, exercise as tolerated, limit sodium intake to no more than 1,500mg/day, limit alcohol intake, maintain medication compliance and practice relaxation techniques to reduce stress.     3. Mixed hyperlipidemia;  - Elevated LDL and TC. He  Declined any type of medication other than statin.  - Discussed in length risk from DM and HLD , still he dose not want to be on any Rx for that.   Encouraged patient to maintain a low  cholesterol/DASH diet, increase aerobic exercise as tolerated, decrease alcohol, stop smoking if applicable, increase fiber intake, limit sodium intake to no more than 1,500mg/day, increase omega-3 fatty acids, and maintain medication compliance.    4. Benign prostatic hyperplasia with urinary frequency  - Stable, continue current Rx    5. Elevated PSA  - Stable, continue current Rx and f/u with urology    6. Primary insomnia;  - He is no longer taking Ambien, doing well     7. Gastroesophageal reflux disease without esophagitis  - Stable, continue current Rx  Recommendations discussed with patient for decreasing ELHAM symptoms: eliminate foods that may trigger symptoms, avoid alcohol at bedtime, and avoid lying down after 3 hours of eating.  Common irritating foods include: chocolate, garlic, onions, citrus fruits, coffee, alcohol, highly seasoned foods and carbonated beverages.   ew results that were out of normal range however nothing to be concerned about.   Thanks!          I was wearing N95 mask and eye protection during the entire duration of the visit.   Patient was masked the whole entire time.   Minimum social distance of 6 ft maintained through entire visit except for physical exam as documented.          I have fully discussed the nature of the medical condition(s) risks, complications, management, safe and proper use of medications.   She stated no allergy to the above prescribed medication.  I have discussed the SIDE EFFECT OF MEDICATION and importance TO report any side effect , the patient expressed good understanding.  Encouraged medication compliance and the importance of keeping scheduled follow up appointments with me and any other providers.    Patient instructed to follow up with our office for results on any labs/imaging ordered during this visit.    Home care discussed  All questions answered  Patient verbalizes understanding and agrees to treatment plan.     Follow up: Return in about 3 months  (around 11/15/2022) for physical, come fasting.

## 2022-08-15 ENCOUNTER — PRIOR AUTHORIZATION (OUTPATIENT)
Dept: FAMILY MEDICINE CLINIC | Facility: CLINIC | Age: 62
End: 2022-08-15

## 2022-11-14 DIAGNOSIS — E11.9 TYPE 2 DIABETES MELLITUS WITHOUT COMPLICATION, WITHOUT LONG-TERM CURRENT USE OF INSULIN: ICD-10-CM

## 2022-11-14 RX ORDER — EMPAGLIFLOZIN 25 MG/1
TABLET, FILM COATED ORAL
Qty: 90 TABLET | Refills: 1 | Status: SHIPPED | OUTPATIENT
Start: 2022-11-14

## 2022-11-28 DIAGNOSIS — N40.1 BENIGN PROSTATIC HYPERPLASIA WITH URINARY FREQUENCY: ICD-10-CM

## 2022-11-28 DIAGNOSIS — R35.0 BENIGN PROSTATIC HYPERPLASIA WITH URINARY FREQUENCY: ICD-10-CM

## 2022-11-28 DIAGNOSIS — I10 ESSENTIAL HYPERTENSION: ICD-10-CM

## 2022-11-28 RX ORDER — LISINOPRIL 20 MG/1
TABLET ORAL
Qty: 90 TABLET | Refills: 1 | Status: SHIPPED | OUTPATIENT
Start: 2022-11-28

## 2022-11-28 RX ORDER — TAMSULOSIN HYDROCHLORIDE 0.4 MG/1
CAPSULE ORAL
Qty: 90 CAPSULE | Refills: 1 | Status: SHIPPED | OUTPATIENT
Start: 2022-11-28

## 2022-12-01 PROBLEM — Z12.11 SCREENING FOR COLORECTAL CANCER: Status: ACTIVE | Noted: 2022-02-23

## 2022-12-01 PROBLEM — Z12.12 SCREENING FOR COLORECTAL CANCER: Status: ACTIVE | Noted: 2022-02-23

## 2022-12-02 ENCOUNTER — TELEPHONE (OUTPATIENT)
Dept: GASTROENTEROLOGY | Facility: CLINIC | Age: 62
End: 2022-12-02

## 2022-12-02 NOTE — TELEPHONE ENCOUNTER
Caller: Ki Draper    Relationship to patient: Self    Best call back number: [phone]    Chief complaint: PT WANTS TO SCHEDULE COLONOSCOPY     Type of visit: COLONOSCOPY    Requested date: FIRST AVAILABLE      If rescheduling, when is the original appointment:  6/3/22    Additional notes:

## 2022-12-05 NOTE — TELEPHONE ENCOUNTER
CARMEN patient via telephone for. Scheduled 02/17/2023 with arrival time of 10AM . Prep paperwork mailed to verified address on file. Patient advised arrival time may change based on [facility] guidelines. CARMEN LAWLER

## 2022-12-06 ENCOUNTER — OFFICE VISIT (OUTPATIENT)
Dept: FAMILY MEDICINE CLINIC | Facility: CLINIC | Age: 62
End: 2022-12-06

## 2022-12-06 VITALS
OXYGEN SATURATION: 100 % | RESPIRATION RATE: 16 BRPM | DIASTOLIC BLOOD PRESSURE: 70 MMHG | HEIGHT: 69 IN | WEIGHT: 230 LBS | TEMPERATURE: 97.1 F | SYSTOLIC BLOOD PRESSURE: 106 MMHG | BODY MASS INDEX: 34.07 KG/M2 | HEART RATE: 71 BPM

## 2022-12-06 DIAGNOSIS — R35.0 BENIGN PROSTATIC HYPERPLASIA WITH URINARY FREQUENCY: ICD-10-CM

## 2022-12-06 DIAGNOSIS — I10 ESSENTIAL HYPERTENSION: ICD-10-CM

## 2022-12-06 DIAGNOSIS — E11.9 TYPE 2 DIABETES MELLITUS WITHOUT COMPLICATION, WITHOUT LONG-TERM CURRENT USE OF INSULIN: ICD-10-CM

## 2022-12-06 DIAGNOSIS — Z00.00 ENCOUNTER FOR ANNUAL PHYSICAL EXAM: Primary | ICD-10-CM

## 2022-12-06 DIAGNOSIS — N40.1 BENIGN PROSTATIC HYPERPLASIA WITH URINARY FREQUENCY: ICD-10-CM

## 2022-12-06 DIAGNOSIS — E78.2 MIXED HYPERLIPIDEMIA: ICD-10-CM

## 2022-12-06 DIAGNOSIS — K21.9 CHRONIC GERD: ICD-10-CM

## 2022-12-06 LAB
BILIRUB BLD-MCNC: NEGATIVE MG/DL
CLARITY, POC: CLEAR
COLOR UR: YELLOW
EXPIRATION DATE: ABNORMAL
EXPIRATION DATE: ABNORMAL
GLUCOSE UR STRIP-MCNC: ABNORMAL MG/DL
HBA1C MFR BLD: 8.5 %
KETONES UR QL: ABNORMAL
LEUKOCYTE EST, POC: NEGATIVE
Lab: ABNORMAL
Lab: ABNORMAL
NITRITE UR-MCNC: NEGATIVE MG/ML
PH UR: 5.5 [PH] (ref 5–8)
PROT UR STRIP-MCNC: NEGATIVE MG/DL
RBC # UR STRIP: NEGATIVE /UL
SP GR UR: 1.02 (ref 1–1.03)
UROBILINOGEN UR QL: ABNORMAL

## 2022-12-06 PROCEDURE — 3052F HG A1C>EQUAL 8.0%<EQUAL 9.0%: CPT | Performed by: FAMILY MEDICINE

## 2022-12-06 PROCEDURE — 36416 COLLJ CAPILLARY BLOOD SPEC: CPT | Performed by: FAMILY MEDICINE

## 2022-12-06 PROCEDURE — 81003 URINALYSIS AUTO W/O SCOPE: CPT | Performed by: FAMILY MEDICINE

## 2022-12-06 PROCEDURE — 96127 BRIEF EMOTIONAL/BEHAV ASSMT: CPT | Performed by: FAMILY MEDICINE

## 2022-12-06 PROCEDURE — 83036 HEMOGLOBIN GLYCOSYLATED A1C: CPT | Performed by: FAMILY MEDICINE

## 2022-12-06 PROCEDURE — 99396 PREV VISIT EST AGE 40-64: CPT | Performed by: FAMILY MEDICINE

## 2022-12-06 NOTE — PROGRESS NOTES
Patient Care Team:  Yen Harmon MD as PCP - General (Urgent Care)     Chief complaint: Patient is in today for a physical          Patient is a 62 y.o. male who presents for his yearly physical exam.     HPI      Doing well, eats RD, like country food , likes mash potato   HTN;  well controlled with current Rx, no S/E reported.   HLD; eats RD, not taking statin for > 6 months as he dose not like it   DM type 2; not well controlled , as above dose not eat HD. A1c was 7.8, today 8.5  He was on Tradjenta but his insurance dose not cover it   Time for eye check   Insomnia; no longer needs Ambien. He is off on this medication for many months.   GERD;  well controlled with Rx, no S/E reported.    BPH with hx of elevated PSA; doing well on rx, no S/E reported. Following with urology   Scheduled on 2/17 for CSC     Lab Results   Component Value Date    HGBA1C 8.5 12/06/2022         Health maintenance/lifestyle:  Immunization History   Administered Date(s) Administered   • COVID-19 (PFIZER) BIVALENT BOOSTER 12+YRS 09/21/2022   • COVID-19 (PFIZER) PURPLE CAP 03/26/2021, 04/16/2021           PHQ-2 Depression Screening  Little interest or pleasure in doing things? 0-->not at all   Feeling down, depressed, or hopeless? 0-->not at all   PHQ-2 Total Score 0         Social History     Tobacco Use   Smoking Status Never   Smokeless Tobacco Never     Social History     Substance and Sexual Activity   Alcohol Use Yes   • Alcohol/week: 6.0 standard drinks   • Types: 3 Cans of beer, 3 Shots of liquor per week    Comment: 3-4 drinks/wk         Review of Systems   Constitutional: Negative for activity change, appetite change, chills and fever.   Respiratory: Negative for cough, shortness of breath, wheezing and stridor.    Cardiovascular: Negative for chest pain, palpitations and leg swelling.         History  Past Medical History:   Diagnosis Date   • Benign prostatic hyperplasia 11111   • Chest pain    • Diabetes mellitus (HCC)     • Dizziness    • Enlarged prostate    • Erectile dysfunction    • Fatigue    • Hyperlipidemia    • Lightheadedness    • Shortness of breath    • Sleep apnea       Past Surgical History:   Procedure Laterality Date   • CARPAL TUNNEL RELEASE     • HIP SURGERY     • JOINT REPLACEMENT  hip      Allergies   Allergen Reactions   • Amoxicillin Other (See Comments)     LOW BACK PAIN   • Other       Family History   Problem Relation Age of Onset   • Diabetes Mother    • Heart disease Father    • Diabetes Sister    • Diabetes Brother    • Heart disease Brother      Social History     Socioeconomic History   • Marital status: Single   Tobacco Use   • Smoking status: Never   • Smokeless tobacco: Never   Substance and Sexual Activity   • Alcohol use: Yes     Alcohol/week: 6.0 standard drinks     Types: 3 Cans of beer, 3 Shots of liquor per week     Comment: 3-4 drinks/wk   • Drug use: No   • Sexual activity: Yes     Partners: Female     Birth control/protection: Condom        Current Outpatient Medications:   •  Jardiance 25 MG tablet tablet, TAKE ONE TABLET BY MOUTH DAILY, Disp: 90 tablet, Rfl: 1  •  lansoprazole (PREVACID) 15 MG capsule, Take 15 mg by mouth Daily., Disp: , Rfl:   •  lisinopril (PRINIVIL,ZESTRIL) 20 MG tablet, TAKE ONE TABLET BY MOUTH DAILY, Disp: 90 tablet, Rfl: 1  •  metFORMIN (GLUCOPHAGE) 1000 MG tablet, TAKE ONE TABLET BY MOUTH TWICE A DAY WITH MEALS, Disp: 180 tablet, Rfl: 3  •  Multiple Vitamin (MULTI VITAMIN PO), Take  by mouth., Disp: , Rfl:   •  Omega-3 Fatty Acids (FISH OIL) 1000 MG capsule capsule, Take  by mouth Daily With Breakfast., Disp: , Rfl:   •  sildenafil (REVATIO) 20 MG tablet, TAKE ONE TABLET BY MOUTH DAILY AS NEEDED, Disp: 30 tablet, Rfl: 0  •  simvastatin (ZOCOR) 40 MG tablet, Take 1 tablet by mouth Every Night., Disp: 90 tablet, Rfl: 3  •  SITagliptin (Januvia) 100 MG tablet, Take 1 tablet by mouth Daily., Disp: 30 tablet, Rfl: 3  •  tamsulosin (FLOMAX) 0.4 MG capsule 24 hr capsule,  "TAKE ONE CAPSULE BY MOUTH DAILY, Disp: 90 capsule, Rfl: 1  •  zolpidem (AMBIEN) 10 MG tablet, Take 1 tablet by mouth At Night As Needed for Sleep., Disp: 90 tablet, Rfl: 0                  /70   Pulse 71   Temp 97.1 °F (36.2 °C)   Resp 16   Ht 175.3 cm (69\")   Wt 104 kg (230 lb)   SpO2 100%   BMI 33.97 kg/m²       Physical Exam  Vitals and nursing note reviewed.   Constitutional:       General: He is not in acute distress.     Appearance: He is not ill-appearing, toxic-appearing or diaphoretic.   HENT:      Head: Normocephalic.      Right Ear: Tympanic membrane, ear canal and external ear normal.      Left Ear: Tympanic membrane, ear canal and external ear normal.      Nose: Nose normal. No congestion or rhinorrhea.      Mouth/Throat:      Mouth: Mucous membranes are moist.      Pharynx: Oropharynx is clear. No oropharyngeal exudate or posterior oropharyngeal erythema.   Eyes:      General: No scleral icterus.        Right eye: No discharge.         Left eye: No discharge.      Extraocular Movements: Extraocular movements intact.      Conjunctiva/sclera: Conjunctivae normal.      Pupils: Pupils are equal, round, and reactive to light.   Neck:      Comments: No enlarged thyroid      Cardiovascular:      Rate and Rhythm: Normal rate and regular rhythm.      Pulses:           Dorsalis pedis pulses are 1+ on the right side and 1+ on the left side.      Heart sounds: Normal heart sounds. No murmur heard.    No friction rub. No gallop.   Pulmonary:      Effort: Pulmonary effort is normal. No respiratory distress.      Breath sounds: Normal breath sounds. No stridor. No wheezing, rhonchi or rales.   Abdominal:      General: Bowel sounds are normal. There is no distension.      Palpations: Abdomen is soft. There is no mass.      Tenderness: There is no abdominal tenderness. There is no right CVA tenderness, left CVA tenderness, guarding or rebound.      Hernia: No hernia is present.   Musculoskeletal:         " General: Normal range of motion.      Cervical back: Normal range of motion and neck supple.      Right lower leg: No edema.      Left lower leg: No edema.      Right foot: Normal range of motion. No deformity, bunion or Charcot foot.      Left foot: Normal range of motion. No deformity, bunion or Charcot foot.   Feet:      Right foot:      Protective Sensation: 5 sites tested. 5 sites sensed.      Skin integrity: Skin integrity normal.      Toenail Condition: Right toenails are normal.      Left foot:      Protective Sensation: 5 sites tested. 5 sites sensed.      Skin integrity: Skin integrity normal.      Toenail Condition: Left toenails are normal.   Lymphadenopathy:      Cervical: No cervical adenopathy.   Skin:     General: Skin is warm.      Coloration: Skin is not jaundiced or pale.      Findings: No erythema or rash.   Neurological:      General: No focal deficit present.      Mental Status: He is alert and oriented to person, place, and time.      Cranial Nerves: No cranial nerve deficit.      Sensory: No sensory deficit.      Motor: No weakness.      Coordination: Coordination normal.      Gait: Gait normal.      Deep Tendon Reflexes: Reflexes normal.   Psychiatric:         Mood and Affect: Mood normal.         Behavior: Behavior normal.         Thought Content: Thought content normal.         Judgment: Judgment normal.                   Diagnoses and all orders for this visit:    1. Encounter for annual physical exam (Primary)  -     POC Urinalysis Dipstick, Automated  -     CBC (No Diff)    2. Type 2 diabetes mellitus without complication, without long-term current use of insulin (HCC)  -     POC Glycosylated Hemoglobin (Hb A1C)  - Not well controlled , discussed in length about diet and medication , he declined adding another medication but he will work on his diet   - Close BG monitoring and f/u , BG login form provided  -     Comprehensive Metabolic Panel    3. Essential hypertension  - Well  controlled , will continue current Rx  -     Comprehensive Metabolic Panel    4. Mixed hyperlipidemia  - Pt not on HD nor on medication   - Dicussed in length about diet and statin  - Will check labs today , if still high will restart on statin   -     Lipid Panel  Encouraged patient to maintain a low cholesterol/DASH diet, increase aerobic exercise as tolerated, decrease alcohol, stop smoking if applicable, increase fiber intake, limit sodium intake to no more than 1,500mg/day, increase omega-3 fatty acids, and maintain medication compliance.     5. Benign prostatic hyperplasia with urinary frequency  - Well controlled , will continue current Rx    6. Chronic GERD  - Well controlled , will continue current Rx        Lifestyle Counseling:  · Lifestyle Modifications: Continue good lifestyle choices/modifications, Follow a low fat, low cholesterol diet, Reduce exposure to stress if possible and Discussed sexual issues, safe sex practices  · Recommended annual dental/vision examination.  · Emotional/Stress/Sleep: Reviewed and  given when appropriate    Declined all needed vaccination     Follow up: Return in about 29 days (around 1/4/2023) for F/U ON DM.  Plan of care discussed with pt. They verbalized understanding and agreement.     Yen Harmon MD   12/6/2022   09:38 EST

## 2022-12-07 LAB
ALBUMIN SERPL-MCNC: 4.8 G/DL (ref 3.5–5.2)
ALBUMIN/GLOB SERPL: 3 G/DL
ALP SERPL-CCNC: 71 U/L (ref 39–117)
ALT SERPL-CCNC: 24 U/L (ref 1–41)
AST SERPL-CCNC: 15 U/L (ref 1–40)
BILIRUB SERPL-MCNC: 0.8 MG/DL (ref 0–1.2)
BUN SERPL-MCNC: 16 MG/DL (ref 8–23)
BUN/CREAT SERPL: 14.3 (ref 7–25)
CALCIUM SERPL-MCNC: 10.1 MG/DL (ref 8.6–10.5)
CHLORIDE SERPL-SCNC: 99 MMOL/L (ref 98–107)
CHOLEST SERPL-MCNC: 316 MG/DL (ref 0–200)
CO2 SERPL-SCNC: 25.2 MMOL/L (ref 22–29)
CREAT SERPL-MCNC: 1.12 MG/DL (ref 0.76–1.27)
EGFRCR SERPLBLD CKD-EPI 2021: 74.3 ML/MIN/1.73
ERYTHROCYTE [DISTWIDTH] IN BLOOD BY AUTOMATED COUNT: 13.8 % (ref 12.3–15.4)
GLOBULIN SER CALC-MCNC: 1.6 GM/DL
GLUCOSE SERPL-MCNC: 200 MG/DL (ref 65–99)
HCT VFR BLD AUTO: 49.1 % (ref 37.5–51)
HDLC SERPL-MCNC: 37 MG/DL (ref 40–60)
HGB BLD-MCNC: 16.2 G/DL (ref 13–17.7)
LDLC SERPL CALC-MCNC: 161 MG/DL (ref 0–100)
MCH RBC QN AUTO: 30.8 PG (ref 26.6–33)
MCHC RBC AUTO-ENTMCNC: 33 G/DL (ref 31.5–35.7)
MCV RBC AUTO: 93.3 FL (ref 79–97)
PLATELET # BLD AUTO: 276 10*3/MM3 (ref 140–450)
POTASSIUM SERPL-SCNC: 4.8 MMOL/L (ref 3.5–5.2)
PROT SERPL-MCNC: 6.4 G/DL (ref 6–8.5)
RBC # BLD AUTO: 5.26 10*6/MM3 (ref 4.14–5.8)
SODIUM SERPL-SCNC: 137 MMOL/L (ref 136–145)
TRIGL SERPL-MCNC: 594 MG/DL (ref 0–150)
VLDLC SERPL CALC-MCNC: 118 MG/DL (ref 5–40)
WBC # BLD AUTO: 6.46 10*3/MM3 (ref 3.4–10.8)

## 2023-01-11 ENCOUNTER — OFFICE VISIT (OUTPATIENT)
Dept: FAMILY MEDICINE CLINIC | Facility: CLINIC | Age: 63
End: 2023-01-11
Payer: MEDICAID

## 2023-01-11 VITALS
RESPIRATION RATE: 16 BRPM | TEMPERATURE: 96.8 F | DIASTOLIC BLOOD PRESSURE: 80 MMHG | HEART RATE: 78 BPM | WEIGHT: 224.3 LBS | BODY MASS INDEX: 33.12 KG/M2 | OXYGEN SATURATION: 99 % | SYSTOLIC BLOOD PRESSURE: 120 MMHG

## 2023-01-11 DIAGNOSIS — E11.9 TYPE 2 DIABETES MELLITUS WITHOUT COMPLICATION, WITHOUT LONG-TERM CURRENT USE OF INSULIN: Primary | ICD-10-CM

## 2023-01-11 PROCEDURE — 99213 OFFICE O/P EST LOW 20 MIN: CPT | Performed by: FAMILY MEDICINE

## 2023-01-11 NOTE — PROGRESS NOTES
Subjective     Ki Draper is a 62 y.o. male.     Chief Complaint   Patient presents with   • Diabetes     1 MONTH FOLLOW UP       History of Present Illness     DM type 2;  Last OV , A1C was up from 7.8 to 8.5 as he was not eating healthy low carbs/sugar diet. since that time he eats more HD low in sugar and carbs. He attributed this elevation in A1c from the medication as he was doing well on Tradjenta but his insurance dose not cover it , currently he is on Januvia.   Home FBS RANGES  , some readings [id number]  No hypoglycemic sx     Lab Results   Component Value Date    HGBA1C 8.5 12/06/2022          The following portions of the patient's history were reviewed and updated as appropriate: allergies, current medications, past family history, past medical history, past social history, past surgical history and problem list.        Review of Systems   Constitutional: Negative for activity change, appetite change and fatigue.   Cardiovascular: Negative for chest pain.   Endocrine: Negative for polydipsia and polyuria.       Vitals:    01/11/23 0902   BP: 120/80   Pulse: 78   Resp: 16   Temp: 96.8 °F (36 °C)   SpO2: 99%           01/11/23 0902   Weight: 102 kg (224 lb 4.8 oz)       Body mass index is 33.12 kg/m².      Current Outpatient Medications   Medication Sig Dispense Refill   • Jardiance 25 MG tablet tablet TAKE ONE TABLET BY MOUTH DAILY 90 tablet 1   • lansoprazole (PREVACID) 15 MG capsule Take 15 mg by mouth Daily.     • lisinopril (PRINIVIL,ZESTRIL) 20 MG tablet TAKE ONE TABLET BY MOUTH DAILY 90 tablet 1   • metFORMIN (GLUCOPHAGE) 1000 MG tablet TAKE ONE TABLET BY MOUTH TWICE A DAY WITH MEALS 180 tablet 3   • Multiple Vitamin (MULTI VITAMIN PO) Take  by mouth.     • Omega-3 Fatty Acids (FISH OIL) 1000 MG capsule capsule Take  by mouth Daily With Breakfast.     • sildenafil (REVATIO) 20 MG tablet TAKE ONE TABLET BY MOUTH DAILY AS NEEDED 30 tablet 0   • simvastatin (ZOCOR) 40 MG tablet Take 1  tablet by mouth Every Night. 90 tablet 3   • SITagliptin (Januvia) 100 MG tablet Take 1 tablet by mouth Daily. 30 tablet 3   • zolpidem (AMBIEN) 10 MG tablet Take 1 tablet by mouth At Night As Needed for Sleep. 90 tablet 0   • tamsulosin (FLOMAX) 0.4 MG capsule 24 hr capsule TAKE ONE CAPSULE BY MOUTH DAILY 90 capsule 1     No current facility-administered medications for this visit.                Objective   Physical Exam  Vitals and nursing note reviewed.   Constitutional:       General: He is not in acute distress.     Appearance: He is not ill-appearing, toxic-appearing or diaphoretic.   HENT:      Mouth/Throat:      Mouth: Mucous membranes are moist.   Cardiovascular:      Rate and Rhythm: Normal rate and regular rhythm.      Heart sounds: Normal heart sounds. No murmur heard.  Pulmonary:      Effort: Pulmonary effort is normal. No respiratory distress.      Breath sounds: Normal breath sounds. No stridor. No wheezing, rhonchi or rales.   Musculoskeletal:      Cervical back: Neck supple.   Feet:      Comments: Diabetic foot exam:   Left: Filament test present   Pulses Dorsalis Pedis:  present   Reflexes 4+    Vibratory sensation normal   Proprioception normal   Sharp/dull discrimination normal         Skin:     General: Skin is warm.   Neurological:      Mental Status: He is alert and oriented to person, place, and time.   Psychiatric:         Mood and Affect: Mood normal.         Behavior: Behavior normal.         Thought Content: Thought content normal.           Assessment & Plan   Diagnoses and all orders for this visit:    1. Type 2 diabetes mellitus without complication, without long-term current use of insulin (HCC) (Primary)  - Home BG reading much improved, continue current Rx and diet   - Discussed in length about lifestyle modification , wt loss, eating healthy , daily exercise     Patient was given instructions and counseling regarding her condition or for health maintenance advice.   Please see  specific information pulled into the AVS if appropriate.   Medical assistant and I wore mask and eyewear protection during entire encounter.    Patient wore mask.         I have fully discussed the nature of the medical condition(s) risks, complications, management, safe and proper use of medications.   She stated no allergy to the above prescribed medication.  I have discussed the SIDE EFFECT OF MEDICATION and importance TO report any side effect , the patient expressed good understanding.  Encouraged medication compliance and the importance of keeping scheduled follow up appointments with me and any other providers.    Patient instructed to follow up with our office for results on any labs/imaging ordered during this visit.    Home care discussed  All questions answered  Patient verbalizes understanding and agrees to treatment plan.     Follow up: Return in about 8 weeks (around 3/8/2023) for F/U ON DM.

## 2023-02-06 DIAGNOSIS — E11.9 TYPE 2 DIABETES MELLITUS WITHOUT COMPLICATION, WITHOUT LONG-TERM CURRENT USE OF INSULIN: ICD-10-CM

## 2023-02-13 ENCOUNTER — TELEPHONE (OUTPATIENT)
Dept: GASTROENTEROLOGY | Facility: CLINIC | Age: 63
End: 2023-02-13
Payer: MEDICAID

## 2023-02-13 DIAGNOSIS — E11.9 TYPE 2 DIABETES MELLITUS WITHOUT COMPLICATION, WITHOUT LONG-TERM CURRENT USE OF INSULIN: ICD-10-CM

## 2023-02-13 RX ORDER — SITAGLIPTIN 100 MG/1
TABLET, FILM COATED ORAL
Qty: 30 TABLET | Refills: 1 | Status: SHIPPED | OUTPATIENT
Start: 2023-02-13

## 2023-02-13 NOTE — TELEPHONE ENCOUNTER
Hub staff attempted to follow warm transfer process and was unsuccessful     Caller: Ki Draper    Relationship to patient: Self    Best call back number: [phone]    Patient is needing: PT IS SCHEUDLED FOR COLONOSCOPY ON 2/17/23 AND WAS PRESCRIBED ANTIBIOTICS FROM DENTIST. PT WANTS TO KNOW IF OK TO TAKE WITH UPCOMING COLONOSCOPY. CALL BACK ANYTIME. OK TO [facility].

## 2023-02-17 ENCOUNTER — HOSPITAL ENCOUNTER (OUTPATIENT)
Facility: HOSPITAL | Age: 63
Setting detail: HOSPITAL OUTPATIENT SURGERY
Discharge: HOME | End: 2023-02-17
Attending: INTERNAL MEDICINE | Admitting: INTERNAL MEDICINE
Payer: MEDICAID

## 2023-02-17 ENCOUNTER — ANESTHESIA EVENT (OUTPATIENT)
Dept: GASTROENTEROLOGY | Facility: HOSPITAL | Age: 63
End: 2023-02-17
Payer: MEDICAID

## 2023-02-17 ENCOUNTER — ANESTHESIA (OUTPATIENT)
Dept: GASTROENTEROLOGY | Facility: HOSPITAL | Age: 63
End: 2023-02-17
Payer: MEDICAID

## 2023-02-17 VITALS
HEART RATE: 60 BPM | HEIGHT: 69 IN | OXYGEN SATURATION: 100 % | BODY MASS INDEX: 33.47 KG/M2 | WEIGHT: 226 LBS | SYSTOLIC BLOOD PRESSURE: 123 MMHG | RESPIRATION RATE: 18 BRPM | TEMPERATURE: 98.4 F | DIASTOLIC BLOOD PRESSURE: 100 MMHG

## 2023-02-17 DIAGNOSIS — Z12.12 SCREENING FOR COLORECTAL CANCER: ICD-10-CM

## 2023-02-17 DIAGNOSIS — Z12.11 SCREENING FOR COLORECTAL CANCER: ICD-10-CM

## 2023-02-17 LAB — GLUCOSE BLDC GLUCOMTR-MCNC: 157 MG/DL (ref 70–130)

## 2023-02-17 PROCEDURE — 88305 TISSUE EXAM BY PATHOLOGIST: CPT | Performed by: INTERNAL MEDICINE

## 2023-02-17 PROCEDURE — 45380 COLONOSCOPY AND BIOPSY: CPT | Performed by: INTERNAL MEDICINE

## 2023-02-17 PROCEDURE — 0 LIDOCAINE 1 % SOLUTION: Performed by: NURSE ANESTHETIST, CERTIFIED REGISTERED

## 2023-02-17 PROCEDURE — 45385 COLONOSCOPY W/LESION REMOVAL: CPT | Performed by: INTERNAL MEDICINE

## 2023-02-17 PROCEDURE — S0260 H&P FOR SURGERY: HCPCS | Performed by: INTERNAL MEDICINE

## 2023-02-17 PROCEDURE — 25010000002 PROPOFOL 10 MG/ML EMULSION: Performed by: NURSE ANESTHETIST, CERTIFIED REGISTERED

## 2023-02-17 PROCEDURE — 82962 GLUCOSE BLOOD TEST: CPT

## 2023-02-17 RX ORDER — SODIUM CHLORIDE 9 MG/ML
40 INJECTION, SOLUTION INTRAVENOUS AS NEEDED
Status: DISCONTINUED | OUTPATIENT
Start: 2023-02-17 | End: 2023-02-17 | Stop reason: HOSPADM

## 2023-02-17 RX ORDER — SODIUM CHLORIDE, SODIUM LACTATE, POTASSIUM CHLORIDE, CALCIUM CHLORIDE 600; 310; 30; 20 MG/100ML; MG/100ML; MG/100ML; MG/100ML
30 INJECTION, SOLUTION INTRAVENOUS CONTINUOUS PRN
Status: DISCONTINUED | OUTPATIENT
Start: 2023-02-17 | End: 2023-02-17 | Stop reason: HOSPADM

## 2023-02-17 RX ORDER — PROPOFOL 10 MG/ML
VIAL (ML) INTRAVENOUS AS NEEDED
Status: DISCONTINUED | OUTPATIENT
Start: 2023-02-17 | End: 2023-02-17 | Stop reason: SURG

## 2023-02-17 RX ORDER — SODIUM CHLORIDE 0.9 % (FLUSH) 0.9 %
10 SYRINGE (ML) INJECTION EVERY 12 HOURS SCHEDULED
Status: DISCONTINUED | OUTPATIENT
Start: 2023-02-17 | End: 2023-02-17 | Stop reason: HOSPADM

## 2023-02-17 RX ORDER — LIDOCAINE HYDROCHLORIDE 10 MG/ML
INJECTION, SOLUTION INFILTRATION; PERINEURAL AS NEEDED
Status: DISCONTINUED | OUTPATIENT
Start: 2023-02-17 | End: 2023-02-17 | Stop reason: SURG

## 2023-02-17 RX ORDER — SODIUM CHLORIDE 0.9 % (FLUSH) 0.9 %
10 SYRINGE (ML) INJECTION AS NEEDED
Status: DISCONTINUED | OUTPATIENT
Start: 2023-02-17 | End: 2023-02-17 | Stop reason: HOSPADM

## 2023-02-17 RX ADMIN — LIDOCAINE HYDROCHLORIDE 30 MG: 10 INJECTION, SOLUTION INFILTRATION; PERINEURAL at 10:52

## 2023-02-17 RX ADMIN — PROPOFOL 180 MCG/KG/MIN: 10 INJECTION, EMULSION INTRAVENOUS at 10:53

## 2023-02-17 RX ADMIN — SODIUM CHLORIDE, POTASSIUM CHLORIDE, SODIUM LACTATE AND CALCIUM CHLORIDE 30 ML/HR: 600; 310; 30; 20 INJECTION, SOLUTION INTRAVENOUS at 10:46

## 2023-02-17 RX ADMIN — PROPOFOL 50 MG: 10 INJECTION, EMULSION INTRAVENOUS at 10:52

## 2023-02-17 NOTE — H&P
"[facility]  Pre Procedure History & Physical    Chief Complaint:   Screening for colorectal cancer    Subjective     HPI:   This 62-year-old male presents the endoscopy suite for colonoscopic evaluation.  This is a screening assessment for colorectal cancer.  Last colonoscopy performed in 2010 with hyperplastic polyps removed.    Past Medical History:   Past Medical History:   Diagnosis Date   • Benign prostatic hyperplasia 11111   • Chest pain    • Diabetes mellitus (HCC)    • Dizziness    • Enlarged prostate    • Erectile dysfunction    • Fatigue    • Hyperlipidemia    • Lightheadedness    • Shortness of breath    • Sleep apnea     cpap       Past Surgical History:  Past Surgical History:   Procedure Laterality Date   • CARPAL TUNNEL RELEASE     • HIP SURGERY     • JOINT REPLACEMENT  hip       Family History:  Family History   Problem Relation Age of Onset   • Diabetes Mother    • Heart disease Father    • Diabetes Sister    • Diabetes Brother    • Heart disease Brother    • Malig Hyperthermia Neg Hx        Social History:   reports that he has never smoked. He has never used smokeless tobacco. He reports current alcohol use of about 6.0 standard drinks per week. He reports that he does not use drugs.    Medications:   No medications prior to admission.       Allergies:  Amoxicillin    ROS:    Pertinent items are noted in HPI, all other systems reviewed and negative     Objective     Height 175.3 cm (69.02\").    Physical Exam   Constitutional: Pt is oriented to person, place, and time and well-developed, well-nourished, and in no distress.   Mouth/Throat: Oropharynx is clear and moist.   Neck: Normal range of motion.   Cardiovascular: Normal rate, regular rhythm and normal heart sounds.    Pulmonary/Chest: Effort normal and breath sounds normal.   Abdominal: Soft. Nontender  Skin: Skin is warm and dry.   Psychiatric: Mood, memory, affect and judgment normal.     Assessment & Plan "     Diagnosis:  Screening for colorectal cancer    Anticipated Surgical Procedure:  Colonoscopy    The risks, benefits, and alternatives of this procedure have been discussed with the patient or the responsible party- the patient understands and agrees to proceed.

## 2023-02-17 NOTE — ANESTHESIA PREPROCEDURE EVALUATION
Anesthesia Evaluation     NPO Solid Status: > 8 hours             Airway   Mallampati: II  TM distance: >3 FB  Neck ROM: full  no difficulty expected  Dental - normal exam     Pulmonary - normal exam   (+) shortness of breath, sleep apnea,   Cardiovascular - normal exam    (+) hypertension, hyperlipidemia,       Neuro/Psych  (+) dizziness/light headedness, numbness,    GI/Hepatic/Renal/Endo    (+)  GERD,  diabetes mellitus type 2,     Musculoskeletal     Abdominal  - normal exam   Substance History      OB/GYN          Other   arthritis,                      Anesthesia Plan    ASA 3     MAC       Anesthetic plan, risks, benefits, and alternatives have been provided, discussed and informed consent has been obtained with: patient.        CODE STATUS:

## 2023-02-17 NOTE — DISCHARGE INSTRUCTIONS

## 2023-02-17 NOTE — ANESTHESIA POSTPROCEDURE EVALUATION
"Patient: Ki Draper    Procedure Summary     Date: 02/17/23 Room / Location: [facility] ENDOSCOPY 10 / [facility] ENDOSCOPY    Anesthesia Start: 1048 Anesthesia Stop: 1127    Procedure: COLONOSCOPY to cecum and TI with cold / hot snare polypectomies Diagnosis:       Colon polyps      Hemorrhoid      (Screening for colorectal cancer [Z12.11, Z12.12])    Surgeons: Joe Ayala MD Provider: Ham Ornelas MD    Anesthesia Type: MAC ASA Status: 3          Anesthesia Type: MAC    Vitals  Vitals Value Taken Time   /100 02/17/23 1146   Temp 36.9 °C (98.4 °F) 02/17/23 1135   Pulse 60 02/17/23 1146   Resp 18 02/17/23 1146   SpO2 100 % 02/17/23 1146           Post Anesthesia Care and Evaluation    Patient location during evaluation: bedside  Patient participation: complete - patient participated  Level of consciousness: awake and alert  Pain management: adequate    Airway patency: patent  Anesthetic complications: No anesthetic complications    Cardiovascular status: acceptable  Respiratory status: acceptable  Hydration status: acceptable    Comments: /100 (BP Location: Left arm, Patient Position: Lying)   Pulse 60   Temp 36.9 °C (98.4 °F) (Oral)   Resp 18   Ht 175.3 cm (69\")   Wt 103 kg (226 lb)   SpO2 100%   BMI 33.37 kg/m²       "

## 2023-02-20 LAB
LAB AP CASE REPORT: NORMAL
LAB AP CLINICAL INFORMATION: NORMAL
PATH REPORT.FINAL DX SPEC: NORMAL
PATH REPORT.GROSS SPEC: NORMAL

## 2023-03-08 ENCOUNTER — OFFICE VISIT (OUTPATIENT)
Dept: FAMILY MEDICINE CLINIC | Facility: CLINIC | Age: 63
End: 2023-03-08
Payer: MEDICAID

## 2023-03-08 ENCOUNTER — PRIOR AUTHORIZATION (OUTPATIENT)
Dept: FAMILY MEDICINE CLINIC | Facility: CLINIC | Age: 63
End: 2023-03-08

## 2023-03-08 VITALS
HEART RATE: 68 BPM | BODY MASS INDEX: 33.03 KG/M2 | SYSTOLIC BLOOD PRESSURE: 104 MMHG | RESPIRATION RATE: 16 BRPM | HEIGHT: 69 IN | WEIGHT: 223 LBS | TEMPERATURE: 97.7 F | OXYGEN SATURATION: 99 % | DIASTOLIC BLOOD PRESSURE: 78 MMHG

## 2023-03-08 DIAGNOSIS — E11.9 TYPE 2 DIABETES MELLITUS WITHOUT COMPLICATION, WITHOUT LONG-TERM CURRENT USE OF INSULIN: Primary | ICD-10-CM

## 2023-03-08 DIAGNOSIS — E78.2 MIXED HYPERLIPIDEMIA: ICD-10-CM

## 2023-03-08 DIAGNOSIS — E66.09 CLASS 1 OBESITY DUE TO EXCESS CALORIES WITH SERIOUS COMORBIDITY AND BODY MASS INDEX (BMI) OF 32.0 TO 32.9 IN ADULT: ICD-10-CM

## 2023-03-08 DIAGNOSIS — I10 ESSENTIAL HYPERTENSION: ICD-10-CM

## 2023-03-08 LAB
EXPIRATION DATE: ABNORMAL
HBA1C MFR BLD: 7.9 %
Lab: ABNORMAL

## 2023-03-08 PROCEDURE — 36416 COLLJ CAPILLARY BLOOD SPEC: CPT | Performed by: FAMILY MEDICINE

## 2023-03-08 PROCEDURE — 99214 OFFICE O/P EST MOD 30 MIN: CPT | Performed by: FAMILY MEDICINE

## 2023-03-08 PROCEDURE — 83036 HEMOGLOBIN GLYCOSYLATED A1C: CPT | Performed by: FAMILY MEDICINE

## 2023-03-08 PROCEDURE — 1159F MED LIST DOCD IN RCRD: CPT | Performed by: FAMILY MEDICINE

## 2023-03-08 PROCEDURE — 1160F RVW MEDS BY RX/DR IN RCRD: CPT | Performed by: FAMILY MEDICINE

## 2023-03-08 PROCEDURE — 3051F HG A1C>EQUAL 7.0%<8.0%: CPT | Performed by: FAMILY MEDICINE

## 2023-03-08 RX ORDER — FLASH GLUCOSE SENSOR
KIT MISCELLANEOUS
Qty: 1 EACH | Refills: 4 | Status: SHIPPED | OUTPATIENT
Start: 2023-03-08

## 2023-03-08 NOTE — PROGRESS NOTES
Answers for HPI/ROS submitted by the patient on 3/1/2023  What is the primary reason for your visit?: Diabetes  Diabetes type: type 2  MedicAlert ID: No  Disease duration: 24 Years  impotence: Yes  Current treatments: oral agent (triple therapy)  Treatment compliance: all of the time  Dose schedule: pre-breakfast  Given by: patient  Home blood tests: 1-2 x per day  Monitoring compliance: inadequate  Blood glucose trend: fluctuating minimally  breakfast time: 6-7 am  breakfast glucose level: 110-130  High score: 140-180  Weight trend: fluctuating minimally  Current diet: generally unhealthy  Meal planning: none  Exercise: intermittently  Dietitian visit: No  Eye exam current: No  Sees podiatrist: No    Subjective     Ki Draper is a 62 y.o. male.     Chief Complaint   Patient presents with   • Diabetes     8 week follow up        History of Present Illness       F/u on DM TYPE 2  Current diabetic treatment includes MTF , Januvia and Jardiance   Pt is compliant with treatment.  Pt monitors blood glucose at home.  FBG range is generally 120-140's  There are no hypoglycemic associated symptoms or complications.  He is on ACE inhibitor/angiotensin II receptor blocker.  Eating healthy diet   Not on statin , he declined Rx with statin or any other alternative   Last A1c was 8.5, today is 7.9     Lab Results   Component Value Date    HGBA1C 7.9 03/08/2023     HTN;no home BP check.  Doing well on lisinopril 20 mg , no S/E reported   Eats HD  Denies CP/SOB    HLD  Eating healthy  Last lipid tests showed elevated LDL ,TG and TC, with low HDL .  Current antihyperlipidemic treatment includes diet .   Declined any medications despite discussing risk,     Lab Results   Component Value Date    CHLPL 316 (H) 12/06/2022    TRIG 594 (H) 12/06/2022    HDL 37 (L) 12/06/2022     (H) 12/06/2022     Obesity; lost 3 IBS IN 3 MONTHS     Labs and documentation has been reviewed        The following portions of the patient's history  were reviewed and updated as appropriate: allergies, current medications, past family history, past medical history, past social history, past surgical history and problem list.        Review of Systems   Respiratory: Negative for shortness of breath.    Cardiovascular: Negative for chest pain.   Neurological: Negative for headache.       Vitals:    03/08/23 0916   BP: 104/78   Pulse: 68   Resp: 16   Temp: 97.7 °F (36.5 °C)   SpO2: 99%           03/08/23 0916   Weight: 101 kg (223 lb)         Body mass index is 32.92 kg/m².      Current Outpatient Medications   Medication Sig Dispense Refill   • Januvia 100 MG tablet TAKE ONE TABLET BY MOUTH DAILY 30 tablet 1   • Jardiance 25 MG tablet tablet TAKE ONE TABLET BY MOUTH DAILY (Patient taking differently: 1 tablet Every Night.) 90 tablet 1   • lansoprazole (PREVACID) 15 MG capsule Take 1 capsule by mouth Daily.     • lisinopril (PRINIVIL,ZESTRIL) 20 MG tablet TAKE ONE TABLET BY MOUTH DAILY (Patient taking differently: Take 1 tablet by mouth Every Night.) 90 tablet 1   • metFORMIN (GLUCOPHAGE) 1000 MG tablet TAKE ONE TABLET BY MOUTH TWICE A DAY WITH MEALS 180 tablet 1   • Multiple Vitamin (MULTI VITAMIN PO) Take  by mouth.     • Omega-3 Fatty Acids (FISH OIL) 1000 MG capsule capsule Take  by mouth Daily With Breakfast.     • sildenafil (REVATIO) 20 MG tablet TAKE ONE TABLET BY MOUTH DAILY AS NEEDED 30 tablet 0   • simvastatin (ZOCOR) 40 MG tablet Take 1 tablet by mouth Every Night. 90 tablet 3   • tamsulosin (FLOMAX) 0.4 MG capsule 24 hr capsule TAKE ONE CAPSULE BY MOUTH DAILY 90 capsule 1   • zolpidem (AMBIEN) 10 MG tablet Take 1 tablet by mouth At Night As Needed for Sleep. 90 tablet 0   • Continuous Blood Gluc Sensor (FreeStyle Randa Sensor System) Every 10 (Ten) Days. 1 each 4     No current facility-administered medications for this visit.                Objective   Physical Exam  Vitals and nursing note reviewed.   Constitutional:       General: He is not in acute  distress.     Appearance: He is obese. He is not ill-appearing, toxic-appearing or diaphoretic.   HENT:      Mouth/Throat:      Mouth: Mucous membranes are moist.   Cardiovascular:      Rate and Rhythm: Normal rate and regular rhythm.      Pulses:           Dorsalis pedis pulses are 1+ on the right side and 1+ on the left side.      Heart sounds: Normal heart sounds. No murmur heard.  Pulmonary:      Effort: Pulmonary effort is normal. No respiratory distress.      Breath sounds: Normal breath sounds. No stridor. No wheezing, rhonchi or rales.   Musculoskeletal:      Cervical back: Neck supple.      Right foot: Normal range of motion. No deformity or bunion.      Left foot: Normal range of motion. No deformity or bunion.   Feet:      Right foot:      Protective Sensation: 5 sites tested. 5 sites sensed.      Skin integrity: Skin integrity normal.      Toenail Condition: Right toenails are normal.      Left foot:      Protective Sensation: 5 sites tested. 5 sites sensed.      Skin integrity: Skin integrity normal.      Toenail Condition: Left toenails are normal.   Neurological:      Mental Status: He is alert and oriented to person, place, and time.   Psychiatric:         Mood and Affect: Mood normal.         Behavior: Behavior normal.         Thought Content: Thought content normal.         Diabetic Foot Exam Performed       Assessment & Plan   Diagnoses and all orders for this visit:    1. Type 2 diabetes mellitus without complication, without long-term current use of insulin (HCC) (Primary)  -     POC Glycosylated Hemoglobin (Hb A1C)--> improving   - Continue same Rx and diet   -     Continuous Blood Gluc Sensor (FreeStyle Randa Sensor System); Every 10 (Ten) Days.  Dispense: 1 each; Refill: 4    2. Essential hypertension  - Stable, continue current Rx    3. Mixed hyperlipidemia  - His numbers high , discussed with pat about results, risk and plan of treatment , he declined all sort of medication , he is aware of  risk being diabetic, hypertensive , having high cholesterol and obese , risk of Heart dis will be high.     4. Class 1 obesity due to excess calories with serious comorbidity and body mass index (BMI) of 32.0 to 32.9 in adult;  - Discussed in length about lifestyle modification , wt loss, eating healthy , daily exercise       Patient was given instructions and counseling regarding her condition or for health maintenance advice.   Please see specific information pulled into the AVS if appropriate.   Medical assistant and I wore mask and eyewear protection during entire encounter.    Patient wore mask.         I have fully discussed the nature of the medical condition(s) risks, complications, management, safe and proper use of medications.   She stated no allergy to the above prescribed medication.  I have discussed the SIDE EFFECT OF MEDICATION and importance TO report any side effect , the patient expressed good understanding.  Encouraged medication compliance and the importance of keeping scheduled follow up appointments with me and any other providers.    Patient instructed to follow up with our office for results on any labs/imaging ordered during this visit.    Home care discussed  All questions answered  Patient verbalizes understanding and agrees to treatment plan.     Follow up: Return in about 3 months (around 6/12/2023) for F/U ON DM.

## 2023-04-14 ENCOUNTER — TELEPHONE (OUTPATIENT)
Dept: GASTROENTEROLOGY | Facility: CLINIC | Age: 63
End: 2023-04-14
Payer: MEDICAID

## 2023-04-14 NOTE — TELEPHONE ENCOUNTER
----- Message from Joe BURRELL MD sent at 2/27/2023  2:19 PM EST -----  Regarding: Biopsy results  Okay to call results, recommend follow-up colonoscopy in 3 years time.  Office follow-up sooner as needed  ----- Message -----  From: Lab, Background User  Sent: 2/20/2023   2:24 PM EST  To: Joe BURRELL MD

## 2023-04-17 ENCOUNTER — TELEPHONE (OUTPATIENT)
Dept: GASTROENTEROLOGY | Facility: CLINIC | Age: 63
End: 2023-04-17
Payer: MEDICAID

## 2023-04-17 NOTE — TELEPHONE ENCOUNTER
Caller: Ki Draper    Relationship: Self    Best call back number: [phone]    Caller requesting test results: RETURNING CALL FOR PATH RESULTS        Additional notes: LORENA CALLED THE PATIENT FOR PATH RESULTS

## 2023-04-19 DIAGNOSIS — E11.9 TYPE 2 DIABETES MELLITUS WITHOUT COMPLICATION, WITHOUT LONG-TERM CURRENT USE OF INSULIN: ICD-10-CM

## 2023-04-19 NOTE — TELEPHONE ENCOUNTER
Caller: BaronKi    Relationship: Self    Best call back number: [phone]    Requested Prescriptions:   Requested Prescriptions     Pending Prescriptions Disp Refills   • SITagliptin (Januvia) 100 MG tablet 30 tablet 1     Sig: Take 1 tablet by mouth Daily.        Pharmacy where request should be sent: [facility] [phone] [facility] [address] [facility] AT [facility] [phone] [facility] [phone]      Last office visit with prescribing clinician: 3/8/2023   Last telemedicine visit with prescribing clinician: 6/12/2023   Next office visit with prescribing clinician: 6/12/2023     Additional details provided by patient: PATIENT STATED HE ONLY HAS ENOUGH LEFT FOR THE REST OF THIS WEEK. PATIENT STATED THAT THE PHARMACY SAYS THEY HAVE SENT OVER REQUESTS FOR THE MEDICATION AND THEY HAVE NOT GOTTEN A RESPONSE. PLEASE ADVISE.    Does the patient have less than a 3 day supply:  [] Yes  [x] No    Would you like a call back once the refill request has been completed: [] Yes [x] No    If the office needs to give you a call back, can they leave a voicemail: [] Yes [x] No    Bennie Grande Rep   04/19/23 09:38 EDT

## 2023-05-13 DIAGNOSIS — E11.9 TYPE 2 DIABETES MELLITUS WITHOUT COMPLICATION, WITHOUT LONG-TERM CURRENT USE OF INSULIN: ICD-10-CM

## 2023-05-15 RX ORDER — EMPAGLIFLOZIN 25 MG/1
TABLET, FILM COATED ORAL
Qty: 90 TABLET | Refills: 1 | Status: SHIPPED | OUTPATIENT
Start: 2023-05-15

## 2023-05-30 DIAGNOSIS — R35.0 BENIGN PROSTATIC HYPERPLASIA WITH URINARY FREQUENCY: ICD-10-CM

## 2023-05-30 DIAGNOSIS — I10 ESSENTIAL HYPERTENSION: ICD-10-CM

## 2023-05-30 DIAGNOSIS — N40.1 BENIGN PROSTATIC HYPERPLASIA WITH URINARY FREQUENCY: ICD-10-CM

## 2023-05-30 RX ORDER — TAMSULOSIN HYDROCHLORIDE 0.4 MG/1
1 CAPSULE ORAL DAILY
Qty: 90 CAPSULE | Refills: 1 | Status: SHIPPED | OUTPATIENT
Start: 2023-05-30

## 2023-05-30 RX ORDER — LISINOPRIL 20 MG/1
20 TABLET ORAL NIGHTLY
Qty: 90 TABLET | Refills: 1 | Status: SHIPPED | OUTPATIENT
Start: 2023-05-30

## 2023-05-30 NOTE — TELEPHONE ENCOUNTER
Rx Refill Note  Requested Prescriptions     Pending Prescriptions Disp Refills   • tamsulosin (FLOMAX) 0.4 MG capsule 24 hr capsule 90 capsule 1     Sig: Take 1 capsule by mouth Daily.   • lisinopril (PRINIVIL,ZESTRIL) 20 MG tablet 90 tablet 1     Sig: Take 1 tablet by mouth Every Night.      Last office visit with prescribing clinician: 3/8/2023   Last telemedicine visit with prescribing clinician: Visit date not found   Next office visit with prescribing clinician: 6/12/2023                         Would you like a call back once the refill request has been completed: [] Yes [] No    If the office needs to give you a call back, can they leave a voicemail: [] Yes [] No    Janet Velasco MA  05/30/23, 10:37 EDT

## 2023-06-12 ENCOUNTER — OFFICE VISIT (OUTPATIENT)
Dept: FAMILY MEDICINE CLINIC | Facility: CLINIC | Age: 63
End: 2023-06-12
Payer: MEDICAID

## 2023-06-12 VITALS
OXYGEN SATURATION: 99 % | HEIGHT: 69 IN | TEMPERATURE: 97.8 F | DIASTOLIC BLOOD PRESSURE: 70 MMHG | BODY MASS INDEX: 33.4 KG/M2 | HEART RATE: 73 BPM | SYSTOLIC BLOOD PRESSURE: 104 MMHG | RESPIRATION RATE: 16 BRPM | WEIGHT: 225.5 LBS

## 2023-06-12 DIAGNOSIS — E78.2 MIXED HYPERLIPIDEMIA: ICD-10-CM

## 2023-06-12 DIAGNOSIS — S70.361A TICK BITE OF RIGHT THIGH, INITIAL ENCOUNTER: ICD-10-CM

## 2023-06-12 DIAGNOSIS — I10 ESSENTIAL HYPERTENSION: ICD-10-CM

## 2023-06-12 DIAGNOSIS — E66.09 CLASS 1 OBESITY DUE TO EXCESS CALORIES WITH SERIOUS COMORBIDITY AND BODY MASS INDEX (BMI) OF 32.0 TO 32.9 IN ADULT: ICD-10-CM

## 2023-06-12 DIAGNOSIS — E11.65 TYPE 2 DIABETES MELLITUS WITH HYPERGLYCEMIA, WITHOUT LONG-TERM CURRENT USE OF INSULIN: Primary | ICD-10-CM

## 2023-06-12 DIAGNOSIS — N40.1 BENIGN PROSTATIC HYPERPLASIA WITH URINARY FREQUENCY: ICD-10-CM

## 2023-06-12 DIAGNOSIS — Z12.5 SCREENING PSA (PROSTATE SPECIFIC ANTIGEN): ICD-10-CM
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
## 2023-06-12 DIAGNOSIS — R35.0 BENIGN PROSTATIC HYPERPLASIA WITH URINARY FREQUENCY: ICD-10-CM

## 2023-06-12 LAB
EXPIRATION DATE: ABNORMAL
HBA1C MFR BLD: 7.4 %
Lab: ABNORMAL

## 2023-06-12 NOTE — PROGRESS NOTES
Subjective     Ki Draper is a 62 y.o. male.     Chief Complaint   Patient presents with    Diabetes     3 month follow up.    Hyperlipidemia    Hypertension       History of Present Illness     F/u on the followings;    DM TYPE 2  Pt monitors blood glucose at home.  FBG range is generally 100-140's  Doing well on MTF , Januvia and Jardiance   Pt is compliant with treatment.  There are no hypoglycemic associated symptoms or complications.  He is on ACE inhibitor/angiotensin II receptor blocker.  Tries to  eat healthy diet  Eye check was 1.5 year    Not on statin , he declined Rx with statin or any other alternative   Last A1c was 8.5--> 7.9, today is 7.4    Lab Results   Component Value Date    HGBA1C 7.4 06/12/2023          HTN;  well controlled with current Rx, no S/E reported.     HLD; he is aware that his cholesterol and LDL very high, declined any medication , tries to eat HD , do walking     Obesity; gained 2 Ibs since last OV    BPH ;  well controlled with current Rx, no S/E reported.     He had many tick bites , last one was 2 weeks ago on his Rt thigh, requests Blood check       Lab Results   Component Value Date    GLUCOSE 200 (H) 12/06/2022    BUN 16 12/06/2022    CREATININE 1.12 12/06/2022    EGFRRESULT 74.3 12/06/2022    BCR 14.3 12/06/2022    K 4.8 12/06/2022    CO2 25.2 12/06/2022    CALCIUM 10.1 12/06/2022    PROTENTOTREF 6.4 12/06/2022    ALBUMIN 4.80 12/06/2022    BILITOT 0.8 12/06/2022    AST 15 12/06/2022    ALT 24 12/06/2022     Lab Results   Component Value Date    CHLPL 316 (H) 12/06/2022    TRIG 594 (H) 12/06/2022    HDL 37 (L) 12/06/2022     (H) 12/06/2022         The following portions of the patient's history were reviewed and updated as appropriate: allergies, current medications, past family history, past medical history, past social history, past surgical history, and problem list.        Review of Systems   Cardiovascular:  Negative for chest pain, palpitations and leg  swelling.     Vitals:    06/12/23 0907   BP: 104/70   Pulse: 73   Resp: 16   Temp: 97.8 °F (36.6 °C)   SpO2: 99%           06/12/23 0907   Weight: 102 kg (225 lb 8 oz)         Body mass index is 33.29 kg/m².      Current Outpatient Medications   Medication Sig Dispense Refill    Jardiance 25 MG tablet tablet TAKE ONE TABLET BY MOUTH DAILY 90 tablet 1    lansoprazole (PREVACID) 15 MG capsule Take 1 capsule by mouth Daily.      lisinopril (PRINIVIL,ZESTRIL) 20 MG tablet Take 1 tablet by mouth Every Night. 90 tablet 1    metFORMIN (GLUCOPHAGE) 1000 MG tablet TAKE ONE TABLET BY MOUTH TWICE A DAY WITH MEALS 180 tablet 1    Multiple Vitamin (MULTI VITAMIN PO) Take  by mouth.      Omega-3 Fatty Acids (FISH OIL) 1000 MG capsule capsule Take  by mouth Daily With Breakfast.      sildenafil (REVATIO) 20 MG tablet TAKE ONE TABLET BY MOUTH DAILY AS NEEDED 30 tablet 0    SITagliptin (Januvia) 100 MG tablet Take 1 tablet by mouth Daily. 30 tablet 1    tamsulosin (FLOMAX) 0.4 MG capsule 24 hr capsule Take 1 capsule by mouth Daily. 90 capsule 1    zolpidem (AMBIEN) 10 MG tablet Take 1 tablet by mouth At Night As Needed for Sleep. 90 tablet 0     No current facility-administered medications for this visit.                Objective   Physical Exam  Vitals and nursing note reviewed.   Constitutional:       General: He is not in acute distress.     Appearance: He is not ill-appearing, toxic-appearing or diaphoretic.   Neck:      Comments: No enlarged thyroid      Cardiovascular:      Rate and Rhythm: Normal rate and regular rhythm.      Heart sounds: Normal heart sounds. No murmur heard.  Pulmonary:      Effort: Pulmonary effort is normal. No respiratory distress.      Breath sounds: Normal breath sounds. No stridor. No wheezing or rhonchi.   Musculoskeletal:      Cervical back: Neck supple.      Right lower leg: No edema.      Left lower leg: No edema.   Neurological:      Mental Status: He is alert and oriented to person, place, and  time.   Psychiatric:         Mood and Affect: Mood normal.         Behavior: Behavior normal.         Assessment & Plan   Diagnoses and all orders for this visit:    1. Type 2 diabetes mellitus with hyperglycemia, without long-term current use of insulin (Primary)  Comments:  A1c improving  Orders:  -     Microalbumin / Creatinine Urine Ratio - Urine, Clean Catch  -     POC Glycosylated Hemoglobin (Hb A1C)  -     Comprehensive Metabolic Panel    2. Essential hypertension  Comments:  Stable, continue current Rx  Orders:  -     Comprehensive Metabolic Panel    3. Mixed hyperlipidemia  Comments:  last check was very high, declined Rx.  Orders:  -     Lipid Panel    4. Benign prostatic hyperplasia with urinary frequency  Comments:  Stable, continue current Rx    5. Class 1 obesity due to excess calories with serious comorbidity and body mass index (BMI) of 32.0 to 32.9 in adult    6. Screening PSA (prostate specific antigen)  -     PSA Screen    7. Tick bite of right thigh, initial encounter  -     Lyme Disease Total Antibody With Reflex to Immunoassay           Patient was given instructions and counseling regarding her condition or for health maintenance advice.   Please see specific information pulled into the AVS if appropriate.        I have fully discussed the nature of the medical condition(s) risks, complications, management, safe and proper use of medications.   Pt stated no allergy to the above prescribed medication.  I have discussed the SIDE EFFECT OF MEDICATION and importance TO report any side effect , the patient expressed good understanding.  Encouraged medication compliance and the importance of keeping scheduled follow up appointments with me and any other providers.    Patient instructed to follow up with our office for results on any labs/imaging ordered during this visit.    Home care discussed  All questions answered  Patient verbalizes understanding and agrees to treatment plan.     Follow up:  Return in about 3 months (around 9/12/2023) for follow up on current illness.

## 2023-06-13 LAB
ALBUMIN SERPL-MCNC: 4.4 G/DL (ref 3.5–5.2)
ALBUMIN/CREAT UR: <5 MG/G CREAT (ref 0–29)
ALBUMIN/GLOB SERPL: 2 G/DL
ALP SERPL-CCNC: 71 U/L (ref 39–117)
ALT SERPL-CCNC: 20 U/L (ref 1–41)
AST SERPL-CCNC: 11 U/L (ref 1–40)
B BURGDOR IGG+IGM SER QL IA: NEGATIVE
BILIRUB SERPL-MCNC: 0.5 MG/DL (ref 0–1.2)
BUN SERPL-MCNC: 16 MG/DL (ref 8–23)
BUN/CREAT SERPL: 15.1 (ref 7–25)
CALCIUM SERPL-MCNC: 9.8 MG/DL (ref 8.6–10.5)
CHLORIDE SERPL-SCNC: 100 MMOL/L (ref 98–107)
CHOLEST SERPL-MCNC: 236 MG/DL (ref 0–200)
CO2 SERPL-SCNC: 24 MMOL/L (ref 22–29)
CREAT SERPL-MCNC: 1.06 MG/DL (ref 0.76–1.27)
CREAT UR-MCNC: 59.7 MG/DL
EGFRCR SERPLBLD CKD-EPI 2021: 79.4 ML/MIN/1.73
GLOBULIN SER CALC-MCNC: 2.2 GM/DL
GLUCOSE SERPL-MCNC: 174 MG/DL (ref 65–99)
HDLC SERPL-MCNC: 40 MG/DL (ref 40–60)
LDLC SERPL CALC-MCNC: 142 MG/DL (ref 0–100)
MICROALBUMIN UR-MCNC: <3 UG/ML
POTASSIUM SERPL-SCNC: 4.9 MMOL/L (ref 3.5–5.2)
PROT SERPL-MCNC: 6.6 G/DL (ref 6–8.5)
PSA SERPL-MCNC: 2.33 NG/ML (ref 0–4)
SODIUM SERPL-SCNC: 140 MMOL/L (ref 136–145)
TRIGL SERPL-MCNC: 298 MG/DL (ref 0–150)
VLDLC SERPL CALC-MCNC: 54 MG/DL (ref 5–40)

## 2023-08-04 DIAGNOSIS — E11.9 TYPE 2 DIABETES MELLITUS WITHOUT COMPLICATION, WITHOUT LONG-TERM CURRENT USE OF INSULIN: ICD-10-CM

## 2023-08-18 DIAGNOSIS — E11.9 TYPE 2 DIABETES MELLITUS WITHOUT COMPLICATION, WITHOUT LONG-TERM CURRENT USE OF INSULIN: ICD-10-CM

## 2023-08-18 RX ORDER — SITAGLIPTIN 100 MG/1
TABLET, FILM COATED ORAL
Qty: 30 TABLET | Refills: 1 | Status: SHIPPED | OUTPATIENT
Start: 2023-08-18

## 2023-10-05 ENCOUNTER — TELEPHONE (OUTPATIENT)
Dept: FAMILY MEDICINE CLINIC | Facility: CLINIC | Age: 63
End: 2023-10-05

## 2023-10-05 NOTE — TELEPHONE ENCOUNTER
Pt called in with nose bleed for over and hour, Pt was sent to [facility] and advised to seek care at immediate care or Er. His appointment for 10/05/23 was cancelled

## 2023-10-12 ENCOUNTER — OFFICE VISIT (OUTPATIENT)
Dept: FAMILY MEDICINE CLINIC | Facility: CLINIC | Age: 63
End: 2023-10-12
Payer: MEDICAID

## 2023-10-12 VITALS
HEIGHT: 69 IN | DIASTOLIC BLOOD PRESSURE: 60 MMHG | BODY MASS INDEX: 33.33 KG/M2 | SYSTOLIC BLOOD PRESSURE: 96 MMHG | HEART RATE: 76 BPM | TEMPERATURE: 97.1 F | OXYGEN SATURATION: 97 % | WEIGHT: 225 LBS

## 2023-10-12 DIAGNOSIS — E11.9 TYPE 2 DIABETES MELLITUS WITHOUT COMPLICATION, WITHOUT LONG-TERM CURRENT USE OF INSULIN: Primary | ICD-10-CM

## 2023-10-12 DIAGNOSIS — N40.1 BENIGN PROSTATIC HYPERPLASIA WITH URINARY FREQUENCY: ICD-10-CM

## 2023-10-12 DIAGNOSIS — R35.0 BENIGN PROSTATIC HYPERPLASIA WITH URINARY FREQUENCY: ICD-10-CM

## 2023-10-12 DIAGNOSIS — I10 ESSENTIAL HYPERTENSION: ICD-10-CM

## 2023-10-12 DIAGNOSIS — E78.2 MIXED HYPERLIPIDEMIA: ICD-10-CM

## 2023-10-12 LAB
EXPIRATION DATE: ABNORMAL
HBA1C MFR BLD: 7.7 % (ref 4.5–5.7)
Lab: ABNORMAL

## 2023-10-12 NOTE — PROGRESS NOTES
Subjective     Ki Draper is a 63 y.o. male.     Chief Complaint   Patient presents with    Hypertension     Recheck and follow up    Diabetes     A1c recheck        History of Present Illness       F/u on DM TYPE 2  Pt monitors blood glucose at home, range between 's  Doing well on MTF , Januvia and Jardiance , no S/E reported   Not doing exercise , eats RD  Pt is compliant with treatment.  There are no hypoglycemic associated symptoms or complications.  He is on ACE inhibitor/angiotensin II receptor blocker.  Not on statin , he declined Rx with statin or any other alternative   Last A1c was 8.5--> 7.9, 7.4, today is 7.7    Lab Results   Component Value Date    HGBA1C 7.7 (A) 10/12/2023     BPH ;  well controlled with current Rx, no S/E reported.     HTN; low BP today, feels little lightheaded , he is on Lisinopril 20 mg     GERD;  well controlled with current Rx, no S/E reported.     HLD; declined statin or other Rx, on RD    Lab Results   Component Value Date    CHLPL 236 (H) 06/12/2023    TRIG 298 (H) 06/12/2023    HDL 40 06/12/2023     (H) 06/12/2023         Lab Results   Component Value Date    GLUCOSE 174 (H) 06/12/2023    BUN 16 06/12/2023    CREATININE 1.06 06/12/2023    EGFRRESULT 79.4 06/12/2023    BCR 15.1 06/12/2023    K 4.9 06/12/2023    CO2 24.0 06/12/2023    CALCIUM 9.8 06/12/2023    PROTENTOTREF 6.6 06/12/2023    ALBUMIN 4.4 06/12/2023    BILITOT 0.5 06/12/2023    AST 11 06/12/2023    ALT 20 06/12/2023           The following portions of the patient's history were reviewed and updated as appropriate: allergies, current medications, past family history, past medical history, past social history, past surgical history, and problem list.        Review of Systems   Cardiovascular:  Negative for chest pain.   Neurological:  Positive for light-headedness.       Vitals:    10/12/23 0947   BP: 96/60   Pulse: 76   Temp: 97.1 øF (36.2 øC)   SpO2: 97%           10/12/23  0947   Weight: 102 kg  (225 lb)         Body mass index is 33.23 kg/mý.      Current Outpatient Medications   Medication Sig Dispense Refill    Januvia 100 MG tablet TAKE 1 TABLET BY MOUTH DAILY 30 tablet 1    Jardiance 25 MG tablet tablet TAKE ONE TABLET BY MOUTH DAILY 90 tablet 1    lansoprazole (PREVACID) 15 MG capsule Take 1 capsule by mouth Daily.      lisinopril (PRINIVIL,ZESTRIL) 20 MG tablet Take 1 tablet by mouth Every Night. 90 tablet 1    metFORMIN (GLUCOPHAGE) 1000 MG tablet Take 1 tablet by mouth 2 (Two) Times a Day With Meals. 180 tablet 1    Multiple Vitamin (MULTI VITAMIN PO) Take  by mouth.      Omega-3 Fatty Acids (FISH OIL) 1000 MG capsule capsule Take  by mouth Daily With Breakfast.      sildenafil (REVATIO) 20 MG tablet TAKE ONE TABLET BY MOUTH DAILY AS NEEDED 30 tablet 0    tamsulosin (FLOMAX) 0.4 MG capsule 24 hr capsule Take 1 capsule by mouth Daily. 90 capsule 1    zolpidem (AMBIEN) 10 MG tablet Take 1 tablet by mouth At Night As Needed for Sleep. 90 tablet 0     No current facility-administered medications for this visit.                Objective   Physical Exam  Vitals and nursing note reviewed.   Constitutional:       General: He is not in acute distress.     Appearance: He is not ill-appearing, toxic-appearing or diaphoretic.   Cardiovascular:      Rate and Rhythm: Normal rate and regular rhythm.      Heart sounds: Normal heart sounds. No murmur heard.  Pulmonary:      Effort: Pulmonary effort is normal. No respiratory distress.      Breath sounds: Normal breath sounds. No stridor. No wheezing or rhonchi.   Neurological:      General: No focal deficit present.      Mental Status: He is alert and oriented to person, place, and time.   Psychiatric:         Mood and Affect: Mood normal.         Behavior: Behavior normal.         Thought Content: Thought content normal.           Assessment & Plan   Diagnoses and all orders for this visit:    1. Type 2 diabetes mellitus without complication, without long-term  current use of insulin (Primary)  Comments:  A1c going up, discussed diuet and exercise . continue same Rx  Orders:  -     POC Glycosylated Hemoglobin (Hb A1C)    2. Essential hypertension  Comments:  low today, advised to closely monitor BP , if still low can take 1/2 lisinopril    3. Mixed hyperlipidemia  Comments:  discussed diet and exercise    4. Benign prostatic hyperplasia with urinary frequency  Comments:  stable, continue same Rx        Patient was given instructions and counseling regarding her condition or for health maintenance advice.   Please see specific information pulled into the AVS if appropriate.       I have fully discussed the nature of the medical condition(s) risks, complications, management, safe and proper use of medications.   Encouraged medication compliance and the importance of keeping scheduled follow up appointments with me and any other providers.    Patient instructed to follow up with our office for results on any labs/imaging ordered during this visit.    Home care discussed  All questions answered  Patient verbalizes understanding and agrees to treatment plan.     Follow up: Return in about 4 weeks (around 11/9/2023) for follow up on current illness.

## 2023-10-14 DIAGNOSIS — E11.9 TYPE 2 DIABETES MELLITUS WITHOUT COMPLICATION, WITHOUT LONG-TERM CURRENT USE OF INSULIN: ICD-10-CM

## 2023-10-16 RX ORDER — SITAGLIPTIN 100 MG/1
TABLET, FILM COATED ORAL
Qty: 30 TABLET | Refills: 1 | Status: SHIPPED | OUTPATIENT
Start: 2023-10-16

## 2023-11-13 ENCOUNTER — OFFICE VISIT (OUTPATIENT)
Dept: FAMILY MEDICINE CLINIC | Facility: CLINIC | Age: 63
End: 2023-11-13
Payer: MEDICAID

## 2023-11-13 VITALS
SYSTOLIC BLOOD PRESSURE: 104 MMHG | BODY MASS INDEX: 33.77 KG/M2 | HEIGHT: 69 IN | WEIGHT: 228 LBS | HEART RATE: 64 BPM | DIASTOLIC BLOOD PRESSURE: 68 MMHG | TEMPERATURE: 99 F | OXYGEN SATURATION: 98 %

## 2023-11-13 DIAGNOSIS — E11.9 TYPE 2 DIABETES MELLITUS WITHOUT COMPLICATION, WITHOUT LONG-TERM CURRENT USE OF INSULIN: ICD-10-CM

## 2023-11-13 DIAGNOSIS — I10 ESSENTIAL HYPERTENSION: Primary | ICD-10-CM

## 2023-11-13 RX ORDER — LANCETS 33 GAUGE
EACH MISCELLANEOUS
Qty: 100 EACH | Refills: 12 | Status: SHIPPED | OUTPATIENT
Start: 2023-11-13

## 2023-11-13 RX ORDER — BLOOD-GLUCOSE METER
KIT MISCELLANEOUS
Qty: 1 EACH | Refills: 0 | Status: SHIPPED | OUTPATIENT
Start: 2023-11-13

## 2023-11-13 RX ORDER — EMPAGLIFLOZIN 25 MG/1
TABLET, FILM COATED ORAL
Qty: 90 TABLET | Refills: 1 | Status: SHIPPED | OUTPATIENT
Start: 2023-11-13

## 2023-11-13 NOTE — PROGRESS NOTES
"Subjective     Ki Draper is a 63 y.o. male.     Chief Complaint   Patient presents with    Hypotension     1M fu. Lowest home reading 96/58, highest 122/74    Diabetes       History of Present Illness     HTN; home BP login reviewed , BP runs low without taking lisinopril 20 mg , runs in /50-60'S, pty feels lightheadedness on/off.     DM type 2;  He needs new glucometer   FBS login reviewed for last week range 130-120\"s  Eats HD  Last A1c was 7.7  Doing well on 3 PO medication , no S/E reported     Lab Results   Component Value Date    HGBA1C 7.7 (A) 10/12/2023         The following portions of the patient's history were reviewed and updated as appropriate: allergies, current medications, past family history, past medical history, past social history, past surgical history, and problem list.        Review of Systems   Neurological:  Positive for light-headedness.       Vitals:    11/13/23 1047   BP: 104/68   Pulse: 64   Temp: 99 °F (37.2 °C)   SpO2: 98%           11/13/23  1047   Weight: 103 kg (228 lb)         Body mass index is 33.65 kg/m².      Current Outpatient Medications   Medication Sig Dispense Refill    Januvia 100 MG tablet TAKE 1 TABLET BY MOUTH DAILY 30 tablet 1    Jardiance 25 MG tablet tablet TAKE ONE TABLET BY MOUTH DAILY 90 tablet 1    lansoprazole (PREVACID) 15 MG capsule Take 1 capsule by mouth Daily.      metFORMIN (GLUCOPHAGE) 1000 MG tablet Take 1 tablet by mouth 2 (Two) Times a Day With Meals. 180 tablet 1    Multiple Vitamin (MULTI VITAMIN PO) Take  by mouth.      Omega-3 Fatty Acids (FISH OIL) 1000 MG capsule capsule Take  by mouth Daily With Breakfast.      sildenafil (REVATIO) 20 MG tablet TAKE ONE TABLET BY MOUTH DAILY AS NEEDED 30 tablet 0    tamsulosin (FLOMAX) 0.4 MG capsule 24 hr capsule Take 1 capsule by mouth Daily. 90 capsule 1    glucose blood test strip Use as instructed 100 each 12    glucose monitor monitoring kit Check BG daily 1 each 0    OneTouch Delica Lancets 33G " misc Check BG 3 x 100 each 12     No current facility-administered medications for this visit.                Objective   Physical Exam  Vitals and nursing note reviewed.   Constitutional:       General: He is not in acute distress.  Cardiovascular:      Rate and Rhythm: Normal rate and regular rhythm.      Heart sounds: Normal heart sounds. No murmur heard.  Pulmonary:      Effort: Pulmonary effort is normal. No respiratory distress.      Breath sounds: Normal breath sounds. No stridor. No wheezing or rhonchi.   Neurological:      Mental Status: He is alert and oriented to person, place, and time.   Psychiatric:         Mood and Affect: Mood normal.         Behavior: Behavior normal.         Thought Content: Thought content normal.           Assessment & Plan   Diagnoses and all orders for this visit:    1. Essential hypertension (Primary)  Comments:  bp runs low, will keep on hold Lisinopril 20 mg with close BP monitoring  advised to take 1/2 lisinopril if his BP goes up    2. Type 2 diabetes mellitus without complication, without long-term current use of insulin  Comments:  FBS improvig  discussed diet  Orders:  -     glucose monitor monitoring kit; Check BG daily  Dispense: 1 each; Refill: 0  -     glucose blood test strip; Use as instructed  Dispense: 100 each; Refill: 12  -     OneTouch Delica Lancets 33G misc; Check BG 3 x  Dispense: 100 each; Refill: 12          Patient was given instructions and counseling regarding her condition or for health maintenance advice.   Please see specific information pulled into the AVS if appropriate.       I have fully discussed the nature of the medical condition(s) risks, complications, management, safe and proper use of medications.   Encouraged medication compliance and the importance of keeping scheduled follow up appointments with me and any other providers.    Patient instructed to follow up with our office for results on any labs/imaging ordered during this visit.     Home care discussed  All questions answered  Patient verbalizes understanding and agrees to treatment plan.     Follow up: Return in about 9 weeks (around 1/15/2024) for follow up on current illness.

## 2023-11-26 DIAGNOSIS — R35.0 BENIGN PROSTATIC HYPERPLASIA WITH URINARY FREQUENCY: ICD-10-CM

## 2023-11-26 DIAGNOSIS — N40.1 BENIGN PROSTATIC HYPERPLASIA WITH URINARY FREQUENCY: ICD-10-CM

## 2023-11-26 DIAGNOSIS — I10 ESSENTIAL HYPERTENSION: ICD-10-CM

## 2023-11-27 RX ORDER — TAMSULOSIN HYDROCHLORIDE 0.4 MG/1
1 CAPSULE ORAL DAILY
Qty: 90 CAPSULE | Refills: 1 | Status: SHIPPED | OUTPATIENT
Start: 2023-11-27

## 2023-11-27 RX ORDER — LISINOPRIL 20 MG/1
20 TABLET ORAL NIGHTLY
Qty: 90 TABLET | Refills: 1 | OUTPATIENT
Start: 2023-11-27

## 2023-11-27 NOTE — TELEPHONE ENCOUNTER
Rx Refill Note  Requested Prescriptions     Pending Prescriptions Disp Refills    lisinopril (PRINIVIL,ZESTRIL) 20 MG tablet [Pharmacy Med Name: LISINOPRIL 20 MG TABLET] 90 tablet 1     Sig: TAKE ONE TABLET BY MOUTH ONCE NIGHTLY     Signed Prescriptions Disp Refills    tamsulosin (FLOMAX) 0.4 MG capsule 24 hr capsule 90 capsule 1     Sig: TAKE 1 CAPSULE BY MOUTH DAILY     Authorizing Provider: WILNER VIEIRA     Ordering User: HARPER VELASCO      Last office visit with prescribing clinician: 11/13/2023   Last telemedicine visit with prescribing clinician: Visit date not found   Next office visit with prescribing clinician: 1/17/2024                         Would you like a call back once the refill request has been completed: [] Yes [] No    If the office needs to give you a call back, can they leave a voicemail: [] Yes [] No    Harper Velasco MA  11/27/23, 08:00 EST

## 2023-12-19 DIAGNOSIS — E11.9 TYPE 2 DIABETES MELLITUS WITHOUT COMPLICATION, WITHOUT LONG-TERM CURRENT USE OF INSULIN: ICD-10-CM

## 2023-12-19 RX ORDER — SITAGLIPTIN 100 MG/1
TABLET, FILM COATED ORAL
Qty: 30 TABLET | Refills: 1 | Status: SHIPPED | OUTPATIENT
Start: 2023-12-19

## 2024-01-16 ENCOUNTER — OFFICE VISIT (OUTPATIENT)
Dept: FAMILY MEDICINE CLINIC | Facility: CLINIC | Age: 64
End: 2024-01-16
Payer: MEDICAID

## 2024-01-16 VITALS
BODY MASS INDEX: 34.07 KG/M2 | HEART RATE: 73 BPM | HEIGHT: 69 IN | DIASTOLIC BLOOD PRESSURE: 80 MMHG | WEIGHT: 230 LBS | OXYGEN SATURATION: 96 % | TEMPERATURE: 98.2 F | SYSTOLIC BLOOD PRESSURE: 122 MMHG | RESPIRATION RATE: 16 BRPM

## 2024-01-16 DIAGNOSIS — R35.0 BENIGN PROSTATIC HYPERPLASIA WITH URINARY FREQUENCY: ICD-10-CM

## 2024-01-16 DIAGNOSIS — E78.2 MIXED HYPERLIPIDEMIA: ICD-10-CM

## 2024-01-16 DIAGNOSIS — I10 ESSENTIAL HYPERTENSION: ICD-10-CM

## 2024-01-16 DIAGNOSIS — N40.1 BENIGN PROSTATIC HYPERPLASIA WITH URINARY FREQUENCY: ICD-10-CM

## 2024-01-16 DIAGNOSIS — E11.9 TYPE 2 DIABETES MELLITUS WITHOUT COMPLICATION, WITHOUT LONG-TERM CURRENT USE OF INSULIN: Primary | ICD-10-CM

## 2024-01-16 DIAGNOSIS — R21 RASH: ICD-10-CM

## 2024-01-16 PROCEDURE — 1160F RVW MEDS BY RX/DR IN RCRD: CPT | Performed by: FAMILY MEDICINE

## 2024-01-16 PROCEDURE — 3079F DIAST BP 80-89 MM HG: CPT | Performed by: FAMILY MEDICINE

## 2024-01-16 PROCEDURE — 99214 OFFICE O/P EST MOD 30 MIN: CPT | Performed by: FAMILY MEDICINE

## 2024-01-16 PROCEDURE — 1159F MED LIST DOCD IN RCRD: CPT | Performed by: FAMILY MEDICINE

## 2024-01-16 PROCEDURE — 3074F SYST BP LT 130 MM HG: CPT | Performed by: FAMILY MEDICINE

## 2024-01-16 RX ORDER — BLOOD-GLUCOSE METER
KIT MISCELLANEOUS
COMMUNITY
Start: 2023-11-13

## 2024-01-16 NOTE — PROGRESS NOTES
Subjective     Ki Draper is a 63 y.o. male.     Chief Complaint   Patient presents with    Hypertension     9 week f/u    Diabetes    Insect Bite     6 months by a tick, itching redness, right leg in the back of knee       History of Present Illness     HTN; home BP login reviewed , BP looks good with out Rx. He was on lisinopril 20 mg  Denies CP/HA       DM TYPE 2  Pt monitors blood glucose at home, range between 100-130's  Doing well on MTF , Januvia and Jardiance , no S/E reported   Not doing exercise , eats RD  Pt is compliant with treatment.  There are no hypoglycemic associated symptoms or complications.  Not on statin , he declined Rx with statin or any other alternative   Last A1c was 8.5--> 7.9, 7.4,  7.7    HLD; declined statin or other Rx, on HD       BPH ;  well controlled with current Rx, no S/E reported.     H/o tick bite behind his Rt knees, still looks red with on/off itchiness     Lab Results   Component Value Date    HGBA1C 7.7 (A) 10/12/2023     Lab Results   Component Value Date    CHLPL 236 (H) 06/12/2023    TRIG 298 (H) 06/12/2023    HDL 40 06/12/2023     (H) 06/12/2023     Lab Results   Component Value Date    GLUCOSE 174 (H) 06/12/2023    BUN 16 06/12/2023    CREATININE 1.06 06/12/2023    EGFRRESULT 79.4 06/12/2023    BCR 15.1 06/12/2023    K 4.9 06/12/2023    CO2 24.0 06/12/2023    CALCIUM 9.8 06/12/2023    PROTENTOTREF 6.6 06/12/2023    ALBUMIN 4.4 06/12/2023    BILITOT 0.5 06/12/2023    AST 11 06/12/2023    ALT 20 06/12/2023         The following portions of the patient's history were reviewed and updated as appropriate: allergies, current medications, past family history, past medical history, past social history, past surgical history, and problem list.        Review of Systems   Cardiovascular:  Negative for chest pain.   Skin:  Positive for color change.       Vitals:    01/16/24 0922   BP: 122/80   Pulse: 73   Resp: 16   Temp: 98.2 °F (36.8 °C)   SpO2: 96%            01/16/24  0922   Weight: 104 kg (230 lb)         Body mass index is 33.95 kg/m².      Current Outpatient Medications   Medication Sig Dispense Refill    Blood Glucose Monitoring Suppl (FreeStyle Freedom Lite) w/Device kit       glucose blood test strip Use as instructed 100 each 12    Januvia 100 MG tablet TAKE 1 TABLET BY MOUTH DAILY 30 tablet 1    Jardiance 25 MG tablet tablet TAKE ONE TABLET BY MOUTH DAILY 90 tablet 1    lansoprazole (PREVACID) 15 MG capsule Take 1 capsule by mouth Daily.      metFORMIN (GLUCOPHAGE) 1000 MG tablet Take 1 tablet by mouth 2 (Two) Times a Day With Meals. 180 tablet 1    Multiple Vitamin (MULTI VITAMIN PO) Take  by mouth.      Omega-3 Fatty Acids (FISH OIL) 1000 MG capsule capsule Take  by mouth Daily With Breakfast.      OneTouch Delica Lancets 33G misc Check BG 3 x 100 each 12    sildenafil (REVATIO) 20 MG tablet TAKE ONE TABLET BY MOUTH DAILY AS NEEDED 30 tablet 0    tamsulosin (FLOMAX) 0.4 MG capsule 24 hr capsule TAKE 1 CAPSULE BY MOUTH DAILY 90 capsule 1     No current facility-administered medications for this visit.                Objective   Physical Exam  Vitals and nursing note reviewed.   Constitutional:       General: He is not in acute distress.     Appearance: He is not toxic-appearing.   Cardiovascular:      Rate and Rhythm: Normal rate and regular rhythm.      Heart sounds: Normal heart sounds. No murmur heard.  Pulmonary:      Effort: Pulmonary effort is normal. No respiratory distress.      Breath sounds: Normal breath sounds. No stridor. No wheezing or rhonchi.   Skin:     Comments: Mild rash behind the knee    Neurological:      Mental Status: He is alert and oriented to person, place, and time.   Psychiatric:         Mood and Affect: Mood normal.         Behavior: Behavior normal.         Thought Content: Thought content normal.           Assessment & Plan   Diagnoses and all orders for this visit:    1. Type 2 diabetes mellitus without complication, without  long-term current use of insulin (Primary)  Comments:  CONTINUE DIET AND CURRENT rX  DU FOR a1C CHECK  Orders:  -     Hemoglobin A1c    2. Essential hypertension  Comments:  resolved, his BP improved with out Rx  close monitoirng   continue holding  Lisinopril  Orders:  -     Comprehensive Metabolic Panel    3. Mixed hyperlipidemia  Comments:  due for labs  Orders:  -     Lipid Panel    4. Rash  Comments:  mild, discussed supportive care    5. Benign prostatic hyperplasia with urinary frequency  Comments:  stable, continue Rx      Patient was given instructions and counseling regarding her condition or for health maintenance advice.   Please see specific information pulled into the AVS if appropriate.       I have fully discussed the nature of the medical condition(s) risks, complications, management, safe and proper use of medications.   Encouraged medication compliance and the importance of keeping scheduled follow up appointments with me and any other providers.    Patient instructed to follow up with our office for results on any labs/imaging ordered during this visit.    Home care discussed  All questions answered  Patient verbalizes understanding and agrees to treatment plan.     Follow up: Return in about 4 months (around 5/16/2024) for physical.

## 2024-01-17 LAB
ALBUMIN SERPL-MCNC: 4.3 G/DL (ref 3.5–5.2)
ALBUMIN/GLOB SERPL: 1.7 G/DL
ALP SERPL-CCNC: 80 U/L (ref 39–117)
ALT SERPL-CCNC: 19 U/L (ref 1–41)
AST SERPL-CCNC: 21 U/L (ref 1–40)
BILIRUB SERPL-MCNC: 0.8 MG/DL (ref 0–1.2)
BUN SERPL-MCNC: 12 MG/DL (ref 8–23)
BUN/CREAT SERPL: 11.4 (ref 7–25)
CALCIUM SERPL-MCNC: 9.7 MG/DL (ref 8.6–10.5)
CHLORIDE SERPL-SCNC: 100 MMOL/L (ref 98–107)
CHOLEST SERPL-MCNC: 281 MG/DL (ref 0–200)
CO2 SERPL-SCNC: 24.1 MMOL/L (ref 22–29)
CREAT SERPL-MCNC: 1.05 MG/DL (ref 0.76–1.27)
EGFRCR SERPLBLD CKD-EPI 2021: 79.8 ML/MIN/1.73
GLOBULIN SER CALC-MCNC: 2.5 GM/DL
GLUCOSE SERPL-MCNC: 222 MG/DL (ref 65–99)
HBA1C MFR BLD: 8.6 % (ref 4.8–5.6)
HDLC SERPL-MCNC: 37 MG/DL (ref 40–60)
LDLC SERPL CALC-MCNC: 151 MG/DL (ref 0–100)
POTASSIUM SERPL-SCNC: 4.8 MMOL/L (ref 3.5–5.2)
PROT SERPL-MCNC: 6.8 G/DL (ref 6–8.5)
SODIUM SERPL-SCNC: 138 MMOL/L (ref 136–145)
TRIGL SERPL-MCNC: 486 MG/DL (ref 0–150)
VLDLC SERPL CALC-MCNC: 93 MG/DL (ref 5–40)

## 2024-01-19 DIAGNOSIS — E11.9 TYPE 2 DIABETES MELLITUS WITHOUT COMPLICATION, WITHOUT LONG-TERM CURRENT USE OF INSULIN: Primary | ICD-10-CM

## 2024-01-27 DIAGNOSIS — E11.9 TYPE 2 DIABETES MELLITUS WITHOUT COMPLICATION, WITHOUT LONG-TERM CURRENT USE OF INSULIN: ICD-10-CM

## 2024-05-08 DIAGNOSIS — E11.9 TYPE 2 DIABETES MELLITUS WITHOUT COMPLICATION, WITHOUT LONG-TERM CURRENT USE OF INSULIN: ICD-10-CM

## 2024-05-08 RX ORDER — EMPAGLIFLOZIN 25 MG/1
25 TABLET, FILM COATED ORAL DAILY
Qty: 90 TABLET | Refills: 1 | Status: SHIPPED | OUTPATIENT
Start: 2024-05-08

## 2024-05-13 ENCOUNTER — TELEPHONE (OUTPATIENT)
Dept: FAMILY MEDICINE CLINIC | Facility: CLINIC | Age: 64
End: 2024-05-13

## 2024-05-13 NOTE — TELEPHONE ENCOUNTER
Caller: Ki Draper    Relationship: Self    Best call back number: [phone]     What was the call regarding: PATIENT STATES PHARMACY SAID THEY NEVER RECEIVED THE PRESCRIPTION FOR JARDIANCE. PATIENT STATES PRESCRIPTION MAY NEED A PRIOR AUTHORIZATION DUE TO NEW INSURANCE.

## 2024-05-23 DIAGNOSIS — N40.1 BENIGN PROSTATIC HYPERPLASIA WITH URINARY FREQUENCY: ICD-10-CM

## 2024-05-23 DIAGNOSIS — R35.0 BENIGN PROSTATIC HYPERPLASIA WITH URINARY FREQUENCY: ICD-10-CM

## 2024-05-23 RX ORDER — TAMSULOSIN HYDROCHLORIDE 0.4 MG/1
1 CAPSULE ORAL DAILY
Qty: 90 CAPSULE | Refills: 1 | Status: SHIPPED | OUTPATIENT
Start: 2024-05-23

## 2024-07-16 DIAGNOSIS — E11.9 TYPE 2 DIABETES MELLITUS WITHOUT COMPLICATION, WITHOUT LONG-TERM CURRENT USE OF INSULIN: ICD-10-CM

## 2024-07-16 NOTE — TELEPHONE ENCOUNTER
Rx Refill Note  Requested Prescriptions     Pending Prescriptions Disp Refills    Tradjenta 5 MG tablet tablet [Pharmacy Med Name: TRADJENTA 5 MG TABLET] 90 tablet 1     Sig: TAKE 1 TABLET BY MOUTH DAILY      Last office visit with prescribing clinician: 1/16/2024   Last telemedicine visit with prescribing clinician: Visit date not found   Next office visit with prescribing clinician: Visit date not found                         Would you like a call back once the refill request has been completed: [] Yes [] No    If the office needs to give you a call back, can they leave a voicemail: [] Yes [] No    Janet Velasco MA  07/16/24, 07:18 EDT

## 2024-07-23 ENCOUNTER — PRIOR AUTHORIZATION (OUTPATIENT)
Dept: FAMILY MEDICINE CLINIC | Facility: CLINIC | Age: 64
End: 2024-07-23

## 2024-07-25 DIAGNOSIS — E11.9 TYPE 2 DIABETES MELLITUS WITHOUT COMPLICATION, WITHOUT LONG-TERM CURRENT USE OF INSULIN: ICD-10-CM

## 2024-07-26 DIAGNOSIS — E11.9 TYPE 2 DIABETES MELLITUS WITHOUT COMPLICATION, WITHOUT LONG-TERM CURRENT USE OF INSULIN: ICD-10-CM

## 2024-11-10 DIAGNOSIS — E11.9 TYPE 2 DIABETES MELLITUS WITHOUT COMPLICATION, WITHOUT LONG-TERM CURRENT USE OF INSULIN: ICD-10-CM

## 2024-11-11 NOTE — TELEPHONE ENCOUNTER
Rx Refill Note  Requested Prescriptions     Pending Prescriptions Disp Refills    Jardiance 25 MG tablet tablet [Pharmacy Med Name: JARDIANCE 25 MG TABLET] 90 tablet 1     Sig: TAKE 1 TABLET BY MOUTH DAILY      Last office visit with prescribing clinician: Visit date not found   Last telemedicine visit with prescribing clinician: Visit date not found   Next office visit with prescribing clinician: Visit date not found                         Would you like a call back once the refill request has been completed: [] Yes [] No    If the office needs to give you a call back, can they leave a voicemail: [] Yes [] No    Marianne Lozano MA  11/11/24, 07:44 EST

## 2024-11-15 ENCOUNTER — TELEPHONE (OUTPATIENT)
Dept: FAMILY MEDICINE CLINIC | Facility: CLINIC | Age: 64
End: 2024-11-15

## 2024-11-15 DIAGNOSIS — E11.9 TYPE 2 DIABETES MELLITUS WITHOUT COMPLICATION, WITHOUT LONG-TERM CURRENT USE OF INSULIN: ICD-10-CM

## 2024-11-15 RX ORDER — EMPAGLIFLOZIN 25 MG/1
25 TABLET, FILM COATED ORAL DAILY
Qty: 90 TABLET | OUTPATIENT
Start: 2024-11-15

## 2024-11-15 NOTE — TELEPHONE ENCOUNTER
Caller: Ki Draper    Relationship: Self    Best call back number: [phone]     Which medication are you concerned about: empagliflozin (Jardiance) 25 MG tablet tablet     Who prescribed you this medication: NEREYDA COLLINS    When did you start taking this medication: 3 YEARS    What are your concerns: PATIENT STATES THAT A REFILL FOR THIS MEDICATION WAS ONLY SENT IN FOR 15 DAY SUPPLY.     PATIENT IS GOING TO A NEW PROVIDER DUE TO HIS INSURANCE BUT CANNOT GET IN UNTIL THE MIDDLE OF NEXT MONTH AND DOES NOT HAVE ENOUGH MEDICATION TO GET THROUGH UNTIL THEN.     PATIENT WOULD LIKE TO SEE IF HE CAN GET A REFILL TO GET HIM BY UNTIL THEN.     PLEASE SEND REFILL TO [facility] [id number] [place], [place] - [address] - [phone]  - [phone] FX     PLEASE CALL PATIENT WITH ANY QUESTIONS OR CONCERNS.

## 2024-11-19 DIAGNOSIS — R35.0 BENIGN PROSTATIC HYPERPLASIA WITH URINARY FREQUENCY: ICD-10-CM

## 2024-11-19 DIAGNOSIS — N40.1 BENIGN PROSTATIC HYPERPLASIA WITH URINARY FREQUENCY: ICD-10-CM

## 2024-11-19 RX ORDER — TAMSULOSIN HYDROCHLORIDE 0.4 MG/1
1 CAPSULE ORAL DAILY
Qty: 90 CAPSULE | Refills: 1 | Status: SHIPPED | OUTPATIENT
Start: 2024-11-19

## 2025-05-17 DIAGNOSIS — N40.1 BENIGN PROSTATIC HYPERPLASIA WITH URINARY FREQUENCY: ICD-10-CM

## 2025-05-17 DIAGNOSIS — R35.0 BENIGN PROSTATIC HYPERPLASIA WITH URINARY FREQUENCY: ICD-10-CM

## 2025-05-18 RX ORDER — TAMSULOSIN HYDROCHLORIDE 0.4 MG/1
1 CAPSULE ORAL DAILY
Qty: 30 CAPSULE | Refills: 0 | OUTPATIENT
Start: 2025-05-18

## 2025-05-18 NOTE — TELEPHONE ENCOUNTER
LOV 1/2024  Rx Refill Note  Requested Prescriptions     Pending Prescriptions Disp Refills    tamsulosin (FLOMAX) 0.4 MG capsule 24 hr capsule [Pharmacy Med Name: TAMSULOSIN HCL 0.4 MG CAPSULE] 90 capsule 1     Sig: TAKE 1 CAPSULE BY MOUTH DAILY      Last office visit with prescribing clinician: 1/16/2024   Last telemedicine visit with prescribing clinician: Visit date not found   Next office visit with prescribing clinician: Visit date not found                         Would you like a call back once the refill request has been completed: [] Yes [] No    If the office needs to give you a call back, can they leave a voicemail: [] Yes [] No    Alena Diaz CMA/MARILOU  05/18/25, 11:10 EDT

## (undated) DEVICE — SENSR O2 OXIMAX FNGR A/ 18IN NONSTR

## (undated) DEVICE — CANN O2 ETCO2 FITS ALL CONN CO2 SMPL A/ 7IN DISP LF

## (undated) DEVICE — SNAR POLYP SENSATION STDOVL 27 240 BX40

## (undated) DEVICE — TUBING, SUCTION, 1/4" X 10', STRAIGHT: Brand: MEDLINE

## (undated) DEVICE — LN SMPL CO2 SHTRM SD STREAM W/M LUER

## (undated) DEVICE — SINGLE-USE BIOPSY FORCEPS: Brand: RADIAL JAW 4

## (undated) DEVICE — KT ORCA ORCAPOD DISP STRL

## (undated) DEVICE — ADAPT CLN BIOGUARD AIR/H2O DISP

## (undated) DEVICE — THE SINGLE USE ETRAP – POLYP TRAP IS USED FOR SUCTION RETRIEVAL OF ENDOSCOPICALLY REMOVED POLYPS.: Brand: ETRAP